# Patient Record
Sex: MALE | Race: WHITE | NOT HISPANIC OR LATINO | Employment: OTHER | URBAN - METROPOLITAN AREA
[De-identification: names, ages, dates, MRNs, and addresses within clinical notes are randomized per-mention and may not be internally consistent; named-entity substitution may affect disease eponyms.]

---

## 2017-03-06 RX ORDER — ALPRAZOLAM 0.25 MG/1
TABLET ORAL
Qty: 30 TABLET | Refills: 0 | OUTPATIENT
Start: 2017-03-06

## 2017-03-16 RX ORDER — GLIMEPIRIDE 2 MG/1
TABLET ORAL
Qty: 180 TABLET | Refills: 0 | Status: SHIPPED | OUTPATIENT
Start: 2017-03-16 | End: 2017-04-10 | Stop reason: SDUPTHER

## 2017-03-27 RX ORDER — HYDROCHLOROTHIAZIDE 25 MG/1
TABLET ORAL
Qty: 90 TABLET | Refills: 0 | Status: SHIPPED | OUTPATIENT
Start: 2017-03-27 | End: 2017-06-26 | Stop reason: SDUPTHER

## 2017-03-31 DIAGNOSIS — E78.00 PURE HYPERCHOLESTEROLEMIA: ICD-10-CM

## 2017-03-31 DIAGNOSIS — I10 ESSENTIAL HYPERTENSION: Primary | ICD-10-CM

## 2017-03-31 DIAGNOSIS — R39.12 POOR URINARY STREAM: ICD-10-CM

## 2017-03-31 DIAGNOSIS — IMO0001 UNCONTROLLED TYPE 2 DIABETES MELLITUS WITHOUT COMPLICATION, WITHOUT LONG-TERM CURRENT USE OF INSULIN: ICD-10-CM

## 2017-03-31 DIAGNOSIS — Z00.00 HEALTH CARE MAINTENANCE: ICD-10-CM

## 2017-04-03 LAB
ALBUMIN SERPL-MCNC: 4.4 G/DL (ref 3.5–5.2)
ALBUMIN/CREAT UR: 26.8 MG/G CREAT (ref 0–30)
ALBUMIN/GLOB SERPL: 1.8 G/DL
ALP SERPL-CCNC: 44 U/L (ref 39–117)
ALT SERPL-CCNC: 31 U/L (ref 1–41)
AST SERPL-CCNC: 28 U/L (ref 1–40)
BILIRUB SERPL-MCNC: 0.5 MG/DL (ref 0.1–1.2)
BUN SERPL-MCNC: 15 MG/DL (ref 8–23)
BUN/CREAT SERPL: 20.3 (ref 7–25)
CALCIUM SERPL-MCNC: 9.5 MG/DL (ref 8.6–10.5)
CHLORIDE SERPL-SCNC: 97 MMOL/L (ref 98–107)
CHOLEST SERPL-MCNC: 212 MG/DL (ref 0–200)
CO2 SERPL-SCNC: 30.2 MMOL/L (ref 22–29)
CREAT SERPL-MCNC: 0.74 MG/DL (ref 0.76–1.27)
CREAT UR-MCNC: 165.5 MG/DL
GLOBULIN SER CALC-MCNC: 2.5 GM/DL
GLUCOSE SERPL-MCNC: 126 MG/DL (ref 65–99)
HBA1C MFR BLD: 7.5 % (ref 4.8–5.6)
HDLC SERPL-MCNC: 52 MG/DL (ref 40–60)
LDLC SERPL CALC-MCNC: ABNORMAL MG/DL
MICROALBUMIN UR-MCNC: 44.3 UG/ML
POTASSIUM SERPL-SCNC: 4.4 MMOL/L (ref 3.5–5.2)
PROT SERPL-MCNC: 6.9 G/DL (ref 6–8.5)
PSA SERPL-MCNC: 0.84 NG/ML (ref 0–4)
SODIUM SERPL-SCNC: 141 MMOL/L (ref 136–145)
TRIGL SERPL-MCNC: 441 MG/DL (ref 0–150)
VLDLC SERPL CALC-MCNC: ABNORMAL MG/DL

## 2017-04-10 ENCOUNTER — OFFICE VISIT (OUTPATIENT)
Dept: FAMILY MEDICINE CLINIC | Facility: CLINIC | Age: 62
End: 2017-04-10

## 2017-04-10 VITALS
BODY MASS INDEX: 49.44 KG/M2 | OXYGEN SATURATION: 86 % | WEIGHT: 315 LBS | HEIGHT: 67 IN | TEMPERATURE: 98.4 F | RESPIRATION RATE: 20 BRPM | SYSTOLIC BLOOD PRESSURE: 162 MMHG | HEART RATE: 99 BPM | DIASTOLIC BLOOD PRESSURE: 88 MMHG

## 2017-04-10 DIAGNOSIS — E78.00 PURE HYPERCHOLESTEROLEMIA: ICD-10-CM

## 2017-04-10 DIAGNOSIS — IMO0001 UNCONTROLLED TYPE 2 DIABETES MELLITUS WITHOUT COMPLICATION, WITHOUT LONG-TERM CURRENT USE OF INSULIN: ICD-10-CM

## 2017-04-10 DIAGNOSIS — F41.9 ANXIETY: ICD-10-CM

## 2017-04-10 DIAGNOSIS — J43.8 OTHER EMPHYSEMA (HCC): ICD-10-CM

## 2017-04-10 DIAGNOSIS — I10 ESSENTIAL HYPERTENSION: Primary | ICD-10-CM

## 2017-04-10 PROCEDURE — 99214 OFFICE O/P EST MOD 30 MIN: CPT | Performed by: INTERNAL MEDICINE

## 2017-04-10 RX ORDER — GLIMEPIRIDE 2 MG/1
6 TABLET ORAL
Qty: 180 TABLET | Refills: 0 | OUTPATIENT
Start: 2017-04-10 | End: 2017-07-20 | Stop reason: SDUPTHER

## 2017-04-10 RX ORDER — ALPRAZOLAM 0.25 MG/1
0.25 TABLET ORAL NIGHTLY PRN
Qty: 30 TABLET | Refills: 5 | Status: SHIPPED | OUTPATIENT
Start: 2017-04-10 | End: 2017-10-18 | Stop reason: SDUPTHER

## 2017-04-10 NOTE — PROGRESS NOTES
Subjective   Patient ID: Dell Thorpe is a 62 y.o. male presents with   Chief Complaint   Patient presents with   • Follow-up     on lab results   • Med Refill     on alprazolam       HPI - this patient presents today for follow-up for anxiety, emphysema and smoking addiction, hypercholesterolemia, hypertension and had a-controlled diabetes.  He admits that he's not been taking his blood pressure medicine or his diabetic medicines correctly.  He forgets to take his second dose sometimes.  I talked to him about stopping smoking and he is not interested in that currently.  He does complain of coughing and shortness of breath.  His oxygen sats in the office or 86% we'll try to get him on oxygen.    Hypertension-change diltiazem to 60 mg twice daily continue hydrochlorothiazide 25 and lisinopril 20 recommend weight loss    Anxiety Xanax 0.25 by mouth daily at bedtime, 30, Sky reports obtaining narcotic agreement was signed this medicine helps with his quality of life he has no adverse effect he's adherent to the regimen    COPD-recommend smoking cessation may continue inhalers I gave him a prescription of a anti-cholinergic and beta-2 agonist combination inhaler.  We'll try to get him on oxygen    Hyperlipidemia Pravachol 40 triglycerides are high recommend he start some over-the-counter fish oil he does not have prescription coverage.  Also weight loss.    Uncontrolled type 2 diabetes-increase glimepiride to 6 mg with breakfast continue metformin excised diet and weight loss.        Allergies   Allergen Reactions   • Penicillins        The following portions of the patient's history were reviewed and updated as appropriate: allergies, current medications, past family history, past medical history, past social history, past surgical history and problem list.      Review of Systems   Constitutional: Positive for fatigue.   HENT: Positive for congestion and sore throat.    Eyes: Negative.    Respiratory: Positive for  "cough and shortness of breath.    Cardiovascular: Negative.    Gastrointestinal: Negative.    Genitourinary: Negative.    Musculoskeletal: Negative.    Skin: Negative.    Psychiatric/Behavioral: Negative.        Objective     Vitals:    04/10/17 1019   BP: 162/88   Pulse: 99   Resp: 20   Temp: 98.4 °F (36.9 °C)   TempSrc: Oral   SpO2: (!) 86%   Weight: (!) 339 lb 14.4 oz (154 kg)   Height: 67\" (170.2 cm)         Physical Exam   Constitutional: He is oriented to person, place, and time. He appears well-developed and well-nourished.   Obese and unhealthy appearing   HENT:   Head: Normocephalic and atraumatic.   Cardiovascular: Normal rate and regular rhythm.    Pulmonary/Chest: Effort normal. He has wheezes.   Neurological: He is alert and oriented to person, place, and time.   Psychiatric: He has a normal mood and affect. His behavior is normal.   Vitals reviewed.        Dell was seen today for follow-up and med refill.    Diagnoses and all orders for this visit:    Essential hypertension    Pure hypercholesterolemia    Other emphysema    Uncontrolled type 2 diabetes mellitus without complication, without long-term current use of insulin    Anxiety    Other orders  -     glimepiride (AMARYL) 2 MG tablet; Take 3 tablets by mouth Every Morning Before Breakfast.  -     diltiaZEM (CARDIZEM) 30 MG tablet; Take 2 tablets by mouth 2 (Two) Times a Day.  -     ALPRAZolam (XANAX) 0.25 MG tablet; Take 1 tablet by mouth At Night As Needed for Anxiety.        Call or return to clinic prn if these symptoms worsen or fail to improve as anticipated.  "

## 2017-04-20 ENCOUNTER — TELEPHONE (OUTPATIENT)
Dept: FAMILY MEDICINE CLINIC | Facility: CLINIC | Age: 62
End: 2017-04-20

## 2017-05-08 ENCOUNTER — TELEPHONE (OUTPATIENT)
Dept: FAMILY MEDICINE CLINIC | Facility: CLINIC | Age: 62
End: 2017-05-08

## 2017-06-26 RX ORDER — HYDROCHLOROTHIAZIDE 25 MG/1
TABLET ORAL
Qty: 90 TABLET | Refills: 0 | Status: SHIPPED | OUTPATIENT
Start: 2017-06-26 | End: 2017-10-10 | Stop reason: SDUPTHER

## 2017-07-21 RX ORDER — GLIMEPIRIDE 2 MG/1
TABLET ORAL
Qty: 180 TABLET | Refills: 0 | Status: SHIPPED | OUTPATIENT
Start: 2017-07-21 | End: 2017-10-10 | Stop reason: SDUPTHER

## 2017-10-10 RX ORDER — HYDROCHLOROTHIAZIDE 25 MG/1
TABLET ORAL
Qty: 90 TABLET | Refills: 0 | Status: SHIPPED | OUTPATIENT
Start: 2017-10-10 | End: 2018-02-13 | Stop reason: SDUPTHER

## 2017-10-10 RX ORDER — GLIMEPIRIDE 2 MG/1
2 TABLET ORAL 2 TIMES DAILY
Qty: 180 TABLET | Refills: 0 | Status: SHIPPED | OUTPATIENT
Start: 2017-10-10 | End: 2018-02-13 | Stop reason: SDUPTHER

## 2017-10-18 ENCOUNTER — OFFICE VISIT (OUTPATIENT)
Dept: FAMILY MEDICINE CLINIC | Facility: CLINIC | Age: 62
End: 2017-10-18

## 2017-10-18 VITALS
SYSTOLIC BLOOD PRESSURE: 142 MMHG | HEIGHT: 67 IN | HEART RATE: 80 BPM | TEMPERATURE: 98.2 F | BODY MASS INDEX: 49.44 KG/M2 | RESPIRATION RATE: 20 BRPM | OXYGEN SATURATION: 92 % | DIASTOLIC BLOOD PRESSURE: 84 MMHG | WEIGHT: 315 LBS

## 2017-10-18 DIAGNOSIS — E78.00 PURE HYPERCHOLESTEROLEMIA: ICD-10-CM

## 2017-10-18 DIAGNOSIS — J43.8 OTHER EMPHYSEMA (HCC): ICD-10-CM

## 2017-10-18 DIAGNOSIS — I10 ESSENTIAL HYPERTENSION: Primary | ICD-10-CM

## 2017-10-18 DIAGNOSIS — F41.9 ANXIETY: ICD-10-CM

## 2017-10-18 DIAGNOSIS — IMO0001 UNCONTROLLED TYPE 2 DIABETES MELLITUS WITHOUT COMPLICATION, WITHOUT LONG-TERM CURRENT USE OF INSULIN: ICD-10-CM

## 2017-10-18 PROCEDURE — 99214 OFFICE O/P EST MOD 30 MIN: CPT | Performed by: INTERNAL MEDICINE

## 2017-10-18 RX ORDER — ALPRAZOLAM 0.25 MG/1
0.25 TABLET ORAL NIGHTLY PRN
Qty: 30 TABLET | Refills: 5 | Status: SHIPPED | OUTPATIENT
Start: 2017-10-18 | End: 2018-06-12 | Stop reason: SDUPTHER

## 2017-10-18 NOTE — PROGRESS NOTES
"Subjective   Patient ID: Dell Thorpe is a 62 y.o. male presents with   Chief Complaint   Patient presents with   • Diabetes     6 month check       HPI - This patient has past medical history of smoking addiction, hypertension, hypercholesterolemia, emphysema, uncontrolled type 2 diabetes and anxiety.  Patient generally is noncompliant.  She's not interested in discontinuing cigarettes.  He is here today for refills and labs.    Assessment plan    Essential hypertension diltiazem hydrochlorothiazide lisinopril controlled we'll get a CMP    Hyperlipidemia Pravachol 40 fasting lipid profile    Type 2 diabetes-he's on Amaryl 6 mg get A1c and a CMP    Anxiety Xanax when necessary    Allergies   Allergen Reactions   • Penicillins        The following portions of the patient's history were reviewed and updated as appropriate: allergies, current medications, past family history, past medical history, past social history, past surgical history and problem list.      Review of Systems   Constitutional: Positive for fatigue.   HENT: Negative.    Respiratory: Positive for shortness of breath.    Cardiovascular: Negative.    Gastrointestinal: Negative.    Musculoskeletal: Negative.    Allergic/Immunologic: Negative.    Neurological: Negative.    Psychiatric/Behavioral: Negative.        Objective     Vitals:    10/18/17 0936   BP: 142/84   Pulse: 80   Resp: 20   Temp: 98.2 °F (36.8 °C)   TempSrc: Oral   SpO2: 92%   Weight: (!) 337 lb (153 kg)   Height: 67\" (170.2 cm)         Physical Exam   Constitutional: He is oriented to person, place, and time. He appears well-developed and well-nourished.   HENT:   Head: Normocephalic and atraumatic.   Eyes: EOM are normal. Pupils are equal, round, and reactive to light.   Cardiovascular: Normal rate, regular rhythm and normal heart sounds.    Pulmonary/Chest: Effort normal. He has wheezes.   Neurological: He is alert and oriented to person, place, and time.   Psychiatric: He has a normal " mood and affect. His behavior is normal.   Nursing note and vitals reviewed.        Dell was seen today for diabetes.    Diagnoses and all orders for this visit:    Essential hypertension  -     Lipid Panel  -     Hemoglobin A1c  -     Comprehensive Metabolic Panel    Pure hypercholesterolemia  -     Lipid Panel  -     Hemoglobin A1c  -     Comprehensive Metabolic Panel    Other emphysema  -     Lipid Panel  -     Hemoglobin A1c  -     Comprehensive Metabolic Panel    Uncontrolled type 2 diabetes mellitus without complication, without long-term current use of insulin  -     Lipid Panel  -     Hemoglobin A1c  -     Comprehensive Metabolic Panel    Anxiety  -     Lipid Panel  -     Hemoglobin A1c  -     Comprehensive Metabolic Panel    Other orders  -     ALPRAZolam (XANAX) 0.25 MG tablet; Take 1 tablet by mouth At Night As Needed for Anxiety.        Call or return to clinic prn if these symptoms worsen or fail to improve as anticipated.

## 2017-10-19 LAB
ALBUMIN SERPL-MCNC: 4.2 G/DL (ref 3.5–5.2)
ALBUMIN/GLOB SERPL: 1.7 G/DL
ALP SERPL-CCNC: 46 U/L (ref 39–117)
ALT SERPL-CCNC: 28 U/L (ref 1–41)
AST SERPL-CCNC: 20 U/L (ref 1–40)
BILIRUB SERPL-MCNC: 0.5 MG/DL (ref 0.1–1.2)
BUN SERPL-MCNC: 17 MG/DL (ref 8–23)
BUN/CREAT SERPL: 22.1 (ref 7–25)
CALCIUM SERPL-MCNC: 9.1 MG/DL (ref 8.6–10.5)
CHLORIDE SERPL-SCNC: 98 MMOL/L (ref 98–107)
CHOLEST SERPL-MCNC: 177 MG/DL (ref 0–200)
CO2 SERPL-SCNC: 28.2 MMOL/L (ref 22–29)
CREAT SERPL-MCNC: 0.77 MG/DL (ref 0.76–1.27)
GLOBULIN SER CALC-MCNC: 2.5 GM/DL
GLUCOSE SERPL-MCNC: 143 MG/DL (ref 65–99)
HBA1C MFR BLD: 7.5 % (ref 4.8–5.6)
HDLC SERPL-MCNC: 59 MG/DL (ref 40–60)
LDLC SERPL CALC-MCNC: 73 MG/DL (ref 0–100)
POTASSIUM SERPL-SCNC: 3.7 MMOL/L (ref 3.5–5.2)
PROT SERPL-MCNC: 6.7 G/DL (ref 6–8.5)
SODIUM SERPL-SCNC: 144 MMOL/L (ref 136–145)
TRIGL SERPL-MCNC: 226 MG/DL (ref 0–150)
VLDLC SERPL CALC-MCNC: 45.2 MG/DL (ref 5–40)

## 2018-02-13 RX ORDER — GLIMEPIRIDE 2 MG/1
2 TABLET ORAL
Qty: 180 TABLET | Refills: 1 | Status: SHIPPED | OUTPATIENT
Start: 2018-02-13 | End: 2018-11-02 | Stop reason: SDUPTHER

## 2018-02-13 RX ORDER — HYDROCHLOROTHIAZIDE 25 MG/1
25 TABLET ORAL DAILY
Qty: 90 TABLET | Refills: 1 | Status: SHIPPED | OUTPATIENT
Start: 2018-02-13

## 2018-02-13 RX ORDER — PRAVASTATIN SODIUM 40 MG
40 TABLET ORAL DAILY
Qty: 90 TABLET | Refills: 1 | Status: SHIPPED | OUTPATIENT
Start: 2018-02-13 | End: 2018-03-29

## 2018-02-13 RX ORDER — LISINOPRIL 20 MG/1
20 TABLET ORAL DAILY
Qty: 90 TABLET | Refills: 1 | Status: SHIPPED | OUTPATIENT
Start: 2018-02-13 | End: 2018-07-09 | Stop reason: SDUPTHER

## 2018-02-23 ENCOUNTER — OFFICE VISIT (OUTPATIENT)
Dept: FAMILY MEDICINE CLINIC | Facility: CLINIC | Age: 63
End: 2018-02-23

## 2018-02-23 VITALS
OXYGEN SATURATION: 93 % | TEMPERATURE: 98 F | HEART RATE: 90 BPM | SYSTOLIC BLOOD PRESSURE: 122 MMHG | WEIGHT: 315 LBS | BODY MASS INDEX: 49.44 KG/M2 | HEIGHT: 67 IN | RESPIRATION RATE: 18 BRPM | DIASTOLIC BLOOD PRESSURE: 80 MMHG

## 2018-02-23 DIAGNOSIS — Z12.5 PROSTATE CANCER SCREENING: ICD-10-CM

## 2018-02-23 DIAGNOSIS — R09.02 HYPOXIA: ICD-10-CM

## 2018-02-23 DIAGNOSIS — J43.8 OTHER EMPHYSEMA (HCC): ICD-10-CM

## 2018-02-23 DIAGNOSIS — E78.00 PURE HYPERCHOLESTEROLEMIA: ICD-10-CM

## 2018-02-23 DIAGNOSIS — R39.12 POOR URINARY STREAM: ICD-10-CM

## 2018-02-23 DIAGNOSIS — IMO0001 UNCONTROLLED TYPE 2 DIABETES MELLITUS WITHOUT COMPLICATION, WITHOUT LONG-TERM CURRENT USE OF INSULIN: ICD-10-CM

## 2018-02-23 DIAGNOSIS — I10 ESSENTIAL HYPERTENSION: Primary | ICD-10-CM

## 2018-02-23 PROCEDURE — 99214 OFFICE O/P EST MOD 30 MIN: CPT | Performed by: INTERNAL MEDICINE

## 2018-02-23 RX ORDER — THEOPHYLLINE ANHYDROUS 200 MG/1
200 CAPSULE, EXTENDED RELEASE ORAL DAILY
Qty: 30 CAPSULE | Refills: 1 | Status: SHIPPED | OUTPATIENT
Start: 2018-02-23

## 2018-02-23 NOTE — PROGRESS NOTES
"Subjective   Patient ID: Dell Thorpe is a 63 y.o. male presents with   Chief Complaint   Patient presents with   • needs to renew oxygen orders       HPI - This patient presents today for follow-up and treatment for hypertension, hypercholesterolemia type 2 diabetes he complains of a poor urinary stream and he has emphysema.  He still smokes despite multiple warnings to stop.  He is on oxygen.  He's been on oxygen for a number of years oxygen helps his quality of life and is necessary.  He has known to desaturate at night and with exertion.  I recommend he stay on oxygen    Assessment plan    Essential hypertension controlled with lisinopril    Uncontrolled diabetes Amaryl and metformin will get an A1c    Poor urinary stream will get a PSA    Hypoxia and emphysema-continue oxygen at night and as needed    Hypercholesterolemia pravastatin 40 and get a fasting lipid profile.        Allergies   Allergen Reactions   • Penicillins        The following portions of the patient's history were reviewed and updated as appropriate: allergies, current medications, past family history, past medical history, past social history, past surgical history and problem list.      Review of Systems   Constitutional: Negative.    HENT: Negative.    Respiratory: Positive for cough, shortness of breath and wheezing.    Cardiovascular: Negative.    Gastrointestinal: Negative.    Genitourinary: Positive for difficulty urinating and frequency.   Musculoskeletal: Negative.    Psychiatric/Behavioral: Negative.        Objective     Vitals:    02/23/18 0934   BP: 122/80   Pulse: 90   Resp: 18   Temp: 98 °F (36.7 °C)   TempSrc: Oral   SpO2: 93%   Weight: (!) 148 kg (326 lb)   Height: 170.2 cm (67\")         Physical Exam   Constitutional: He is oriented to person, place, and time. He appears well-developed and well-nourished. No distress.   HENT:   Head: Normocephalic and atraumatic.   Eyes: Conjunctivae and EOM are normal. Pupils are equal, round, " and reactive to light. Right eye exhibits no discharge. Left eye exhibits no discharge. No scleral icterus.   Neck: Normal range of motion. Neck supple. No tracheal deviation present. No thyromegaly present.   Cardiovascular: Normal rate, regular rhythm, normal heart sounds and normal pulses.  Exam reveals no gallop.    No murmur heard.  Pulmonary/Chest: Effort normal. No respiratory distress. He has wheezes. He has no rales.   Musculoskeletal: Normal range of motion.   Neurological: He is alert and oriented to person, place, and time.   Skin: Skin is warm. No rash noted. No erythema. No pallor.   Psychiatric: He has a normal mood and affect. His behavior is normal. Judgment and thought content normal.   Nursing note and vitals reviewed.        Dell was seen today for needs to renew oxygen orders.    Diagnoses and all orders for this visit:    Essential hypertension  -     Lipid Panel  -     PSA DIAGNOSTIC  -     Hemoglobin A1c  -     Microalbumin / Creatinine Urine Ratio - Urine, Clean Catch  -     Comprehensive Metabolic Panel    Pure hypercholesterolemia  -     Lipid Panel  -     PSA DIAGNOSTIC  -     Hemoglobin A1c  -     Microalbumin / Creatinine Urine Ratio - Urine, Clean Catch  -     Comprehensive Metabolic Panel    Uncontrolled type 2 diabetes mellitus without complication, without long-term current use of insulin  -     Lipid Panel  -     PSA DIAGNOSTIC  -     Hemoglobin A1c  -     Microalbumin / Creatinine Urine Ratio - Urine, Clean Catch  -     Comprehensive Metabolic Panel    Prostate cancer screening  -     Lipid Panel  -     PSA DIAGNOSTIC  -     Hemoglobin A1c  -     Microalbumin / Creatinine Urine Ratio - Urine, Clean Catch  -     Comprehensive Metabolic Panel    Poor urinary stream   -     PSA DIAGNOSTIC    Other emphysema    Hypoxia    Other orders  -     SABRA-24 200 MG 24 hr capsule; Take 1 capsule by mouth Daily.        Call or return to clinic prn if these symptoms worsen or fail to improve as  anticipated.

## 2018-02-24 LAB
ALBUMIN SERPL-MCNC: 4.2 G/DL (ref 3.5–5.2)
ALBUMIN/CREAT UR: 32.7 MG/G CREAT (ref 0–30)
ALBUMIN/GLOB SERPL: 1.6 G/DL
ALP SERPL-CCNC: 54 U/L (ref 39–117)
ALT SERPL-CCNC: 20 U/L (ref 1–41)
AST SERPL-CCNC: 12 U/L (ref 1–40)
BILIRUB SERPL-MCNC: 0.6 MG/DL (ref 0.1–1.2)
BUN SERPL-MCNC: 14 MG/DL (ref 8–23)
BUN/CREAT SERPL: 18.7 (ref 7–25)
CALCIUM SERPL-MCNC: 9.7 MG/DL (ref 8.6–10.5)
CHLORIDE SERPL-SCNC: 93 MMOL/L (ref 98–107)
CHOLEST SERPL-MCNC: 204 MG/DL (ref 0–200)
CO2 SERPL-SCNC: 33.6 MMOL/L (ref 22–29)
CREAT SERPL-MCNC: 0.75 MG/DL (ref 0.76–1.27)
CREAT UR-MCNC: 216.2 MG/DL
GFR SERPLBLD CREATININE-BSD FMLA CKD-EPI: 106 ML/MIN/1.73
GFR SERPLBLD CREATININE-BSD FMLA CKD-EPI: 128 ML/MIN/1.73
GLOBULIN SER CALC-MCNC: 2.7 GM/DL
GLUCOSE SERPL-MCNC: 206 MG/DL (ref 65–99)
HBA1C MFR BLD: 9.45 % (ref 4.8–5.6)
HDLC SERPL-MCNC: 47 MG/DL (ref 40–60)
LDLC SERPL CALC-MCNC: 77 MG/DL (ref 0–100)
MICROALBUMIN UR-MCNC: 70.8 UG/ML
POTASSIUM SERPL-SCNC: 4 MMOL/L (ref 3.5–5.2)
PROT SERPL-MCNC: 6.9 G/DL (ref 6–8.5)
PSA SERPL-MCNC: 1.02 NG/ML (ref 0–4)
SODIUM SERPL-SCNC: 138 MMOL/L (ref 136–145)
TRIGL SERPL-MCNC: 400 MG/DL (ref 0–150)
VLDLC SERPL CALC-MCNC: 80 MG/DL (ref 5–40)

## 2018-02-26 DIAGNOSIS — IMO0001 UNCONTROLLED TYPE 2 DIABETES MELLITUS WITHOUT COMPLICATION, WITHOUT LONG-TERM CURRENT USE OF INSULIN: Primary | ICD-10-CM

## 2018-03-27 ENCOUNTER — OFFICE VISIT (OUTPATIENT)
Dept: ENDOCRINOLOGY | Age: 63
End: 2018-03-27

## 2018-03-27 VITALS
RESPIRATION RATE: 16 BRPM | WEIGHT: 315 LBS | HEIGHT: 68 IN | SYSTOLIC BLOOD PRESSURE: 136 MMHG | BODY MASS INDEX: 47.74 KG/M2 | DIASTOLIC BLOOD PRESSURE: 82 MMHG

## 2018-03-27 DIAGNOSIS — E66.01 MORBID OBESITY (HCC): ICD-10-CM

## 2018-03-27 DIAGNOSIS — Z12.5 PROSTATE CANCER SCREENING: ICD-10-CM

## 2018-03-27 DIAGNOSIS — I10 ESSENTIAL HYPERTENSION: ICD-10-CM

## 2018-03-27 DIAGNOSIS — E78.00 PURE HYPERCHOLESTEROLEMIA: ICD-10-CM

## 2018-03-27 DIAGNOSIS — IMO0001 UNCONTROLLED TYPE 2 DIABETES MELLITUS WITHOUT COMPLICATION, WITHOUT LONG-TERM CURRENT USE OF INSULIN: Primary | ICD-10-CM

## 2018-03-27 DIAGNOSIS — Z71.6 TOBACCO ABUSE COUNSELING: ICD-10-CM

## 2018-03-27 DIAGNOSIS — E55.9 VITAMIN D DEFICIENCY: ICD-10-CM

## 2018-03-27 DIAGNOSIS — E78.5 DYSLIPIDEMIA: ICD-10-CM

## 2018-03-27 PROCEDURE — 99214 OFFICE O/P EST MOD 30 MIN: CPT | Performed by: INTERNAL MEDICINE

## 2018-03-27 RX ORDER — EXENATIDE 2 MG/.85ML
2 INJECTION, SUSPENSION, EXTENDED RELEASE SUBCUTANEOUS WEEKLY
Qty: 4 PEN | Refills: 5 | Status: SHIPPED | OUTPATIENT
Start: 2018-03-27 | End: 2018-11-02 | Stop reason: SDUPTHER

## 2018-03-27 RX ORDER — SITAGLIPTIN AND METFORMIN HYDROCHLORIDE 1000; 50 MG/1; MG/1
2 TABLET, FILM COATED, EXTENDED RELEASE ORAL
Qty: 60 TABLET | Refills: 5 | Status: SHIPPED | OUTPATIENT
Start: 2018-03-27 | End: 2018-11-02 | Stop reason: SDUPTHER

## 2018-03-27 RX ORDER — DAPAGLIFLOZIN 10 MG/1
TABLET, FILM COATED ORAL
Qty: 30 TABLET | Refills: 5 | Status: SHIPPED | OUTPATIENT
Start: 2018-03-27 | End: 2018-11-02 | Stop reason: SDUPTHER

## 2018-03-27 NOTE — PROGRESS NOTES
"Subjective   Dell Thorpe is a 63 y.o. male seen as a new patient for DM2. He is not checking BG. He denies any problems or concerns.     History of Present Illness this is a 63-year-old gentleman who has been referred for further evaluation and treatment of his type II diabetes and associated metabolic disorders.  She said she has had diabetes since 2700 has been on metformin and glimepiride.  He also used to be on Avandia and is no longer on that because of its unavailability.  He is disabled because of his COPD and the arthritis.  He has a lifetime heavy smoker and has no plans for stopping a.  During the course of this office visit he could hardly speak in full sentences before interrupting get with series of coughs.  His family history is replete with type II diabetes heart disease and stroke.  He has been  and at this time is  and has never fathered a child.    /82   Resp 16   Ht 172.7 cm (68\")   Wt (!) 146 kg (322 lb 12.8 oz)   BMI 49.08 kg/m²      Allergies   Allergen Reactions   • Penicillins        Current Outpatient Prescriptions:   •  albuterol (PROVENTIL HFA;VENTOLIN HFA) 108 (90 BASE) MCG/ACT inhaler, Proventil  (90 Base) MCG/ACT Inhalation Aerosol Solution; Patient Sig: Proventil  (90 Base) MCG/ACT Inhalation Aerosol Solution INHALE 1 TO 2 PUFFS EVERY 4 TO 6 HOURS AS NEEDED.; 1; 5; 04-Dec-2015; Active, Disp: , Rfl:   •  ALPRAZolam (XANAX) 0.25 MG tablet, Take 1 tablet by mouth At Night As Needed for Anxiety., Disp: 30 tablet, Rfl: 5  •  glimepiride (AMARYL) 2 MG tablet, Take 1 tablet by mouth Every Morning Before Breakfast., Disp: 180 tablet, Rfl: 1  •  hydrochlorothiazide (HYDRODIURIL) 25 MG tablet, Take 1 tablet by mouth Daily., Disp: 90 tablet, Rfl: 1  •  lisinopril (PRINIVIL,ZESTRIL) 20 MG tablet, Take 1 tablet by mouth Daily., Disp: 90 tablet, Rfl: 1  •  metFORMIN (GLUCOPHAGE) 500 MG tablet, Take 1 tablet by mouth 2 (Two) Times a Day With Meals., Disp: 360 " tablet, Rfl: 1  •  pravastatin (PRAVACHOL) 40 MG tablet, Take 1 tablet by mouth Daily., Disp: 90 tablet, Rfl: 1  •  SABRA-24 200 MG 24 hr capsule, Take 1 capsule by mouth Daily., Disp: 30 capsule, Rfl: 1    The following portions of the patient's history were reviewed and updated as appropriate: allergies, current medications, past family history, past medical history, past social history, past surgical history and problem list.    Review of Systems   Constitutional: Negative.    HENT: Negative.    Eyes: Negative.    Respiratory: Negative.    Cardiovascular: Negative.    Gastrointestinal: Negative.    Endocrine: Negative.    Genitourinary: Negative.    Musculoskeletal: Negative.    Skin: Negative.    Allergic/Immunologic: Negative.    Neurological: Negative.    Hematological: Negative.    Psychiatric/Behavioral: Negative.        Objective   Physical Exam   Constitutional: He is oriented to person, place, and time. He appears well-developed and well-nourished. No distress.   Morbidly obese and almost constantly coughing.   HENT:   Head: Normocephalic and atraumatic.   Right Ear: External ear normal.   Left Ear: External ear normal.   Nose: Nose normal.   Mouth/Throat: Oropharynx is clear and moist. No oropharyngeal exudate.   Eyes: Conjunctivae and EOM are normal. Pupils are equal, round, and reactive to light. Right eye exhibits no discharge. Left eye exhibits no discharge. No scleral icterus.   Neck: Normal range of motion. Neck supple. No JVD present. No tracheal deviation present. No thyromegaly present.   Cardiovascular: Normal rate, regular rhythm, normal heart sounds, intact distal pulses and normal pulses.  Exam reveals no gallop and no friction rub.    No murmur heard.  Pulmonary/Chest: Effort normal. No stridor. No respiratory distress. He has wheezes. He has no rales. He exhibits no tenderness.   Both inspiratory and expiratory wheezing   Abdominal: Soft. Bowel sounds are normal. He exhibits no distension  and no mass. There is no tenderness. There is no rebound and no guarding. No hernia.   Massive  abdomen   Musculoskeletal: Normal range of motion. He exhibits no edema, tenderness or deformity.   Lymphadenopathy:     He has no cervical adenopathy.   Neurological: He is alert and oriented to person, place, and time. He has normal reflexes. He displays normal reflexes. No cranial nerve deficit. He exhibits normal muscle tone. Coordination normal.   Skin: Skin is warm and dry. No rash noted. He is not diaphoretic. No erythema. No pallor.   Reddish face and eyes and the rosacea on both cheeks.  Skin tags and acanthosis nigricans around his neck indicating significant degree of insulin resistance   Psychiatric: He has a normal mood and affect. His behavior is normal. Judgment and thought content normal.   Nursing note and vitals reviewed.        Assessment/Plan   Diagnoses and all orders for this visit:    Uncontrolled type 2 diabetes mellitus without complication, without long-term current use of insulin  -     T4 & TSH ([facility])  -     T3, Free  -     T4, Free  -     TestT+TestF+SHBG  -     Thyroglobulin With Anti-TG  -     Uric Acid  -     Vitamin D 25 Hydroxy  -     CBC & Differential  -     Comprehensive Metabolic Panel  -     C-Peptide  -     Follicle Stimulating Hormone  -     Hemoglobin A1c  -     Lipid Panel  -     Luteinizing Hormone  -     MicroAlbumin, Urine, Random - Urine, Clean Catch  -     Prolactin  -     ACTH  -     Cortisol  -     BYDUREON BCISE 2 MG/0.85ML auto-injector injection; Inject 0.85 mL under the skin 1 (One) Time Per Week.  -     JANUMET XR  MG tablet; Take 2 tablets by mouth Daily With Dinner.  -     FARXIGA 10 MG tablet; 1 tablet every morning  -     T4 & TSH ([facility]); Future  -     TestT+TestF+SHBG; Future  -     Uric Acid; Future  -     Vitamin D 25 Hydroxy; Future  -     Comprehensive Metabolic Panel; Future  -     C-Peptide; Future  -     Hemoglobin A1c; Future  -     Lipid  Panel; Future  -     MicroAlbumin, Urine, Random - Urine, Clean Catch; Future  -     Hemoglobin & Hematocrit, Blood; Future    Essential hypertension  -     T4 & TSH ([facility])  -     T3, Free  -     T4, Free  -     TestT+TestF+SHBG  -     Thyroglobulin With Anti-TG  -     Uric Acid  -     Vitamin D 25 Hydroxy  -     CBC & Differential  -     Comprehensive Metabolic Panel  -     C-Peptide  -     Follicle Stimulating Hormone  -     Hemoglobin A1c  -     Lipid Panel  -     Luteinizing Hormone  -     MicroAlbumin, Urine, Random - Urine, Clean Catch  -     Prolactin  -     ACTH  -     Cortisol  -     BYDUREON BCISE 2 MG/0.85ML auto-injector injection; Inject 0.85 mL under the skin 1 (One) Time Per Week.  -     JANUMET XR  MG tablet; Take 2 tablets by mouth Daily With Dinner.  -     FARXIGA 10 MG tablet; 1 tablet every morning  -     T4 & TSH ([facility]); Future  -     TestT+TestF+SHBG; Future  -     Uric Acid; Future  -     Vitamin D 25 Hydroxy; Future  -     Comprehensive Metabolic Panel; Future  -     C-Peptide; Future  -     Hemoglobin A1c; Future  -     Lipid Panel; Future  -     MicroAlbumin, Urine, Random - Urine, Clean Catch; Future  -     Hemoglobin & Hematocrit, Blood; Future    Pure hypercholesterolemia  -     T4 & TSH ([facility])  -     T3, Free  -     T4, Free  -     TestT+TestF+SHBG  -     Thyroglobulin With Anti-TG  -     Uric Acid  -     Vitamin D 25 Hydroxy  -     CBC & Differential  -     Comprehensive Metabolic Panel  -     C-Peptide  -     Follicle Stimulating Hormone  -     Hemoglobin A1c  -     Lipid Panel  -     Luteinizing Hormone  -     MicroAlbumin, Urine, Random - Urine, Clean Catch  -     Prolactin  -     ACTH  -     Cortisol  -     BYDUREON BCISE 2 MG/0.85ML auto-injector injection; Inject 0.85 mL under the skin 1 (One) Time Per Week.  -     JANUMET XR  MG tablet; Take 2 tablets by mouth Daily With Dinner.  -     FARXIGA 10 MG tablet; 1 tablet every morning  -     T4 & TSH ([facility]);  Future  -     TestT+TestF+SHBG; Future  -     Uric Acid; Future  -     Vitamin D 25 Hydroxy; Future  -     Comprehensive Metabolic Panel; Future  -     C-Peptide; Future  -     Hemoglobin A1c; Future  -     Lipid Panel; Future  -     MicroAlbumin, Urine, Random - Urine, Clean Catch; Future  -     Hemoglobin & Hematocrit, Blood; Future    Morbid obesity  -     T4 & TSH ([facility])  -     T3, Free  -     T4, Free  -     TestT+TestF+SHBG  -     Thyroglobulin With Anti-TG  -     Uric Acid  -     Vitamin D 25 Hydroxy  -     CBC & Differential  -     Comprehensive Metabolic Panel  -     C-Peptide  -     Follicle Stimulating Hormone  -     Hemoglobin A1c  -     Lipid Panel  -     Luteinizing Hormone  -     MicroAlbumin, Urine, Random - Urine, Clean Catch  -     Prolactin  -     ACTH  -     Cortisol  -     BYDUREON BCISE 2 MG/0.85ML auto-injector injection; Inject 0.85 mL under the skin 1 (One) Time Per Week.  -     JANUMET XR  MG tablet; Take 2 tablets by mouth Daily With Dinner.  -     FARXIGA 10 MG tablet; 1 tablet every morning  -     T4 & TSH ([facility]); Future  -     TestT+TestF+SHBG; Future  -     Uric Acid; Future  -     Vitamin D 25 Hydroxy; Future  -     Comprehensive Metabolic Panel; Future  -     C-Peptide; Future  -     Hemoglobin A1c; Future  -     Lipid Panel; Future  -     MicroAlbumin, Urine, Random - Urine, Clean Catch; Future  -     Hemoglobin & Hematocrit, Blood; Future    Dyslipidemia  -     T4 & TSH ([facility])  -     T3, Free  -     T4, Free  -     TestT+TestF+SHBG  -     Thyroglobulin With Anti-TG  -     Uric Acid  -     Vitamin D 25 Hydroxy  -     CBC & Differential  -     Comprehensive Metabolic Panel  -     C-Peptide  -     Follicle Stimulating Hormone  -     Hemoglobin A1c  -     Lipid Panel  -     Luteinizing Hormone  -     MicroAlbumin, Urine, Random - Urine, Clean Catch  -     Prolactin  -     ACTH  -     Cortisol  -     BYDUREON BCISE 2 MG/0.85ML auto-injector injection; Inject 0.85 mL  under the skin 1 (One) Time Per Week.  -     JANUMET XR  MG tablet; Take 2 tablets by mouth Daily With Dinner.  -     FARXIGA 10 MG tablet; 1 tablet every morning  -     T4 & TSH ([facility]); Future  -     TestT+TestF+SHBG; Future  -     Uric Acid; Future  -     Vitamin D 25 Hydroxy; Future  -     Comprehensive Metabolic Panel; Future  -     C-Peptide; Future  -     Hemoglobin A1c; Future  -     Lipid Panel; Future  -     MicroAlbumin, Urine, Random - Urine, Clean Catch; Future  -     Hemoglobin & Hematocrit, Blood; Future    Vitamin D deficiency  -     T4 & TSH ([facility])  -     T3, Free  -     T4, Free  -     TestT+TestF+SHBG  -     Thyroglobulin With Anti-TG  -     Uric Acid  -     Vitamin D 25 Hydroxy  -     CBC & Differential  -     Comprehensive Metabolic Panel  -     C-Peptide  -     Follicle Stimulating Hormone  -     Hemoglobin A1c  -     Lipid Panel  -     Luteinizing Hormone  -     MicroAlbumin, Urine, Random - Urine, Clean Catch  -     Prolactin  -     ACTH  -     Cortisol  -     BYDUREON BCISE 2 MG/0.85ML auto-injector injection; Inject 0.85 mL under the skin 1 (One) Time Per Week.  -     JANUMET XR  MG tablet; Take 2 tablets by mouth Daily With Dinner.  -     FARXIGA 10 MG tablet; 1 tablet every morning  -     T4 & TSH ([facility]); Future  -     TestT+TestF+SHBG; Future  -     Uric Acid; Future  -     Vitamin D 25 Hydroxy; Future  -     Comprehensive Metabolic Panel; Future  -     C-Peptide; Future  -     Hemoglobin A1c; Future  -     Lipid Panel; Future  -     MicroAlbumin, Urine, Random - Urine, Clean Catch; Future  -     Hemoglobin & Hematocrit, Blood; Future    Prostate cancer screening  -     T4 & TSH ([facility])  -     T3, Free  -     T4, Free  -     TestT+TestF+SHBG  -     Thyroglobulin With Anti-TG  -     Uric Acid  -     Vitamin D 25 Hydroxy  -     CBC & Differential  -     Comprehensive Metabolic Panel  -     C-Peptide  -     Follicle Stimulating Hormone  -     Hemoglobin A1c  -      Lipid Panel  -     Luteinizing Hormone  -     MicroAlbumin, Urine, Random - Urine, Clean Catch  -     Prolactin  -     ACTH  -     Cortisol  -     BYDUREON BCISE 2 MG/0.85ML auto-injector injection; Inject 0.85 mL under the skin 1 (One) Time Per Week.  -     JANUMET XR  MG tablet; Take 2 tablets by mouth Daily With Dinner.  -     FARXIGA 10 MG tablet; 1 tablet every morning  -     T4 & TSH ([facility]); Future  -     TestT+TestF+SHBG; Future  -     Uric Acid; Future  -     Vitamin D 25 Hydroxy; Future  -     Comprehensive Metabolic Panel; Future  -     C-Peptide; Future  -     Hemoglobin A1c; Future  -     Lipid Panel; Future  -     MicroAlbumin, Urine, Random - Urine, Clean Catch; Future  -     Hemoglobin & Hematocrit, Blood; Future    Tobacco abuse counseling  -     T4 & TSH ([facility]); Future  -     TestT+TestF+SHBG; Future  -     Uric Acid; Future  -     Vitamin D 25 Hydroxy; Future  -     Comprehensive Metabolic Panel; Future  -     C-Peptide; Future  -     Hemoglobin A1c; Future  -     Lipid Panel; Future  -     MicroAlbumin, Urine, Random - Urine, Clean Catch; Future  -     Hemoglobin & Hematocrit, Blood; Future               In summary I saw and examined this 63-year-old gentleman for above-mentioned problems.  I reviewed his laboratory evaluation of 10/18/2017 and 02/23/2018 and at this time we will go ahead and order an extensive laboratory evaluation and once the results come back we will go ahead and call for any possible modification or new medications.  In the meantime I am is starting him on Janumet XR 50/1000 mg to take 2 tablets with supper,Farxiga 5 mg every morning for 4 weeks and if no problem increase it to 10 mg every morning and finally Bydureon 2 mg once weekly.  I also had a discussion with him about the dire consequences of continued smoking and the fact that he is already having trouble with his breathing which has rendered him disabled all as a result of long-standing habit of smoking  cigarettes.  He will see MsTawny Mirlande Corbettyakov in 3 months or sooner if needed with laboratory evaluation prior to each office visit.  This office visit including 35 minutes of the time being spent on face-to-face counseling and education exceeded 60 minutes.

## 2018-03-29 LAB
25(OH)D3+25(OH)D2 SERPL-MCNC: 11.7 NG/ML (ref 30–100)
ACTH PLAS-MCNC: 41.1 PG/ML (ref 7.2–63.3)
ALBUMIN SERPL-MCNC: 4.3 G/DL (ref 3.5–5.2)
ALBUMIN/GLOB SERPL: 1.7 G/DL
ALP SERPL-CCNC: 59 U/L (ref 39–117)
ALT SERPL-CCNC: 30 U/L (ref 1–41)
AST SERPL-CCNC: 27 U/L (ref 1–40)
BASOPHILS # BLD AUTO: 0.03 10*3/MM3 (ref 0–0.2)
BASOPHILS NFR BLD AUTO: 0.4 % (ref 0–1.5)
BILIRUB SERPL-MCNC: 0.5 MG/DL (ref 0.1–1.2)
BUN SERPL-MCNC: 12 MG/DL (ref 8–23)
BUN/CREAT SERPL: 17.6 (ref 7–25)
C PEPTIDE SERPL-MCNC: 5.4 NG/ML (ref 1.1–4.4)
CALCIUM SERPL-MCNC: 9.6 MG/DL (ref 8.6–10.5)
CHLORIDE SERPL-SCNC: 95 MMOL/L (ref 98–107)
CHOLEST SERPL-MCNC: 224 MG/DL (ref 0–200)
CO2 SERPL-SCNC: 28.1 MMOL/L (ref 22–29)
CORTIS SERPL-MCNC: 16.7 UG/DL
CREAT SERPL-MCNC: 0.68 MG/DL (ref 0.76–1.27)
EOSINOPHIL # BLD AUTO: 0.11 10*3/MM3 (ref 0–0.7)
EOSINOPHIL NFR BLD AUTO: 1.3 % (ref 0.3–6.2)
ERYTHROCYTE [DISTWIDTH] IN BLOOD BY AUTOMATED COUNT: 14.3 % (ref 11.5–14.5)
FSH SERPL-ACNC: 4.8 MIU/ML (ref 1.5–12.4)
GFR SERPLBLD CREATININE-BSD FMLA CKD-EPI: 118 ML/MIN/1.73
GFR SERPLBLD CREATININE-BSD FMLA CKD-EPI: 143 ML/MIN/1.73
GLOBULIN SER CALC-MCNC: 2.6 GM/DL
GLUCOSE SERPL-MCNC: 278 MG/DL (ref 65–99)
HBA1C MFR BLD: 9.2 % (ref 4.8–5.6)
HCT VFR BLD AUTO: 51.9 % (ref 40.4–52.2)
HDLC SERPL-MCNC: 45 MG/DL (ref 40–60)
HGB BLD-MCNC: 17 G/DL (ref 13.7–17.6)
IMM GRANULOCYTES # BLD: 0.03 10*3/MM3 (ref 0–0.03)
IMM GRANULOCYTES NFR BLD: 0.4 % (ref 0–0.5)
INTERPRETATION: NORMAL
LDLC SERPL CALC-MCNC: ABNORMAL MG/DL
LH SERPL-ACNC: 5.8 MIU/ML (ref 1.7–8.6)
LYMPHOCYTES # BLD AUTO: 2.72 10*3/MM3 (ref 0.9–4.8)
LYMPHOCYTES NFR BLD AUTO: 32.5 % (ref 19.6–45.3)
Lab: NORMAL
MCH RBC QN AUTO: 32.5 PG (ref 27–32.7)
MCHC RBC AUTO-ENTMCNC: 32.8 G/DL (ref 32.6–36.4)
MCV RBC AUTO: 99.2 FL (ref 79.8–96.2)
MICROALBUMIN UR-MCNC: 105.7 UG/ML
MONOCYTES # BLD AUTO: 0.71 10*3/MM3 (ref 0.2–1.2)
MONOCYTES NFR BLD AUTO: 8.5 % (ref 5–12)
NEUTROPHILS # BLD AUTO: 4.77 10*3/MM3 (ref 1.9–8.1)
NEUTROPHILS NFR BLD AUTO: 56.9 % (ref 42.7–76)
PLATELET # BLD AUTO: 171 10*3/MM3 (ref 140–500)
POTASSIUM SERPL-SCNC: 4.6 MMOL/L (ref 3.5–5.2)
PROLACTIN SERPL-MCNC: 10.6 NG/ML (ref 4–15.2)
PROT SERPL-MCNC: 6.9 G/DL (ref 6–8.5)
RBC # BLD AUTO: 5.23 10*6/MM3 (ref 4.6–6)
SHBG SERPL-SCNC: 35.6 NMOL/L (ref 19.3–76.4)
SODIUM SERPL-SCNC: 139 MMOL/L (ref 136–145)
T3FREE SERPL-MCNC: 2.9 PG/ML (ref 2–4.4)
T4 FREE SERPL-MCNC: 1.36 NG/DL (ref 0.93–1.7)
T4 SERPL-MCNC: 6.3 MCG/DL (ref 4.5–11.7)
TESTOST FREE SERPL-MCNC: 19.6 PG/ML (ref 6.6–18.1)
TESTOST SERPL-MCNC: 224 NG/DL (ref 264–916)
THYROGLOB AB SERPL-ACNC: <1 IU/ML (ref 0–0.9)
THYROGLOB SERPL-MCNC: 26.6 NG/ML (ref 1.4–29.2)
TRIGL SERPL-MCNC: 684 MG/DL (ref 0–150)
TSH SERPL DL<=0.005 MIU/L-ACNC: 1.93 MIU/ML (ref 0.27–4.2)
URATE SERPL-MCNC: 5.3 MG/DL (ref 3.4–7)
VLDLC SERPL CALC-MCNC: ABNORMAL MG/DL
WBC # BLD AUTO: 8.37 10*3/MM3 (ref 4.5–10.7)

## 2018-03-29 RX ORDER — ROSUVASTATIN CALCIUM 20 MG/1
20 TABLET, COATED ORAL NIGHTLY
Qty: 30 TABLET | Refills: 11 | Status: SHIPPED | OUTPATIENT
Start: 2018-03-29 | End: 2018-11-02 | Stop reason: SDUPTHER

## 2018-03-29 RX ORDER — ICOSAPENT ETHYL 1000 MG/1
4 CAPSULE ORAL DAILY
Qty: 120 CAPSULE | Refills: 5 | Status: SHIPPED | OUTPATIENT
Start: 2018-03-29 | End: 2018-07-02 | Stop reason: SDUPTHER

## 2018-03-29 RX ORDER — ERGOCALCIFEROL 1.25 MG/1
50000 CAPSULE ORAL 2 TIMES WEEKLY
Qty: 26 CAPSULE | Refills: 3 | Status: SHIPPED | OUTPATIENT
Start: 2018-03-29 | End: 2018-07-02 | Stop reason: SDUPTHER

## 2018-05-02 ENCOUNTER — TELEPHONE (OUTPATIENT)
Dept: FAMILY MEDICINE CLINIC | Facility: CLINIC | Age: 63
End: 2018-05-02

## 2018-05-03 NOTE — TELEPHONE ENCOUNTER
Can you send in a new glucose monitor strips and lancets for this gentleman to his pharmacy.  Thank you

## 2018-06-12 RX ORDER — ALPRAZOLAM 0.25 MG/1
0.25 TABLET ORAL NIGHTLY PRN
Qty: 30 TABLET | Refills: 0 | OUTPATIENT
Start: 2018-06-12 | End: 2018-07-07 | Stop reason: SDUPTHER

## 2018-06-18 ENCOUNTER — RESULTS ENCOUNTER (OUTPATIENT)
Dept: ENDOCRINOLOGY | Age: 63
End: 2018-06-18

## 2018-06-18 DIAGNOSIS — E78.5 DYSLIPIDEMIA: ICD-10-CM

## 2018-06-18 DIAGNOSIS — Z71.6 TOBACCO ABUSE COUNSELING: ICD-10-CM

## 2018-06-18 DIAGNOSIS — I10 ESSENTIAL HYPERTENSION: ICD-10-CM

## 2018-06-18 DIAGNOSIS — E55.9 VITAMIN D DEFICIENCY: ICD-10-CM

## 2018-06-18 DIAGNOSIS — Z12.5 PROSTATE CANCER SCREENING: ICD-10-CM

## 2018-06-18 DIAGNOSIS — E78.00 PURE HYPERCHOLESTEROLEMIA: ICD-10-CM

## 2018-06-18 DIAGNOSIS — IMO0001 UNCONTROLLED TYPE 2 DIABETES MELLITUS WITHOUT COMPLICATION, WITHOUT LONG-TERM CURRENT USE OF INSULIN: ICD-10-CM

## 2018-06-18 DIAGNOSIS — E66.01 MORBID OBESITY (HCC): ICD-10-CM

## 2018-06-28 ENCOUNTER — OFFICE VISIT (OUTPATIENT)
Dept: ENDOCRINOLOGY | Age: 63
End: 2018-06-28

## 2018-06-28 VITALS
HEIGHT: 68 IN | DIASTOLIC BLOOD PRESSURE: 84 MMHG | BODY MASS INDEX: 46.38 KG/M2 | SYSTOLIC BLOOD PRESSURE: 128 MMHG | WEIGHT: 306 LBS

## 2018-06-28 DIAGNOSIS — IMO0001 UNCONTROLLED TYPE 2 DIABETES MELLITUS WITHOUT COMPLICATION, WITHOUT LONG-TERM CURRENT USE OF INSULIN: Primary | ICD-10-CM

## 2018-06-28 DIAGNOSIS — E78.00 PURE HYPERCHOLESTEROLEMIA: ICD-10-CM

## 2018-06-28 DIAGNOSIS — E78.5 DYSLIPIDEMIA: ICD-10-CM

## 2018-06-28 DIAGNOSIS — E66.01 MORBID OBESITY (HCC): ICD-10-CM

## 2018-06-28 DIAGNOSIS — I10 ESSENTIAL HYPERTENSION: ICD-10-CM

## 2018-06-28 DIAGNOSIS — E55.9 VITAMIN D DEFICIENCY: ICD-10-CM

## 2018-06-28 PROCEDURE — 99214 OFFICE O/P EST MOD 30 MIN: CPT | Performed by: NURSE PRACTITIONER

## 2018-06-29 LAB
25(OH)D3+25(OH)D2 SERPL-MCNC: 10.3 NG/ML (ref 30–100)
ALBUMIN SERPL-MCNC: 4.8 G/DL (ref 3.5–5.2)
ALBUMIN/GLOB SERPL: 2.1 G/DL
ALP SERPL-CCNC: 55 U/L (ref 39–117)
ALT SERPL-CCNC: 18 U/L (ref 1–41)
AST SERPL-CCNC: 13 U/L (ref 1–40)
BASOPHILS # BLD AUTO: 0.03 10*3/MM3 (ref 0–0.2)
BASOPHILS NFR BLD AUTO: 0.3 % (ref 0–1.5)
BILIRUB SERPL-MCNC: 0.4 MG/DL (ref 0.1–1.2)
BUN SERPL-MCNC: 11 MG/DL (ref 8–23)
BUN/CREAT SERPL: 15.5 (ref 7–25)
C PEPTIDE SERPL-MCNC: 4 NG/ML (ref 1.1–4.4)
CALCIUM SERPL-MCNC: 10.1 MG/DL (ref 8.6–10.5)
CHLORIDE SERPL-SCNC: 98 MMOL/L (ref 98–107)
CHOLEST SERPL-MCNC: 163 MG/DL (ref 0–200)
CO2 SERPL-SCNC: 27.9 MMOL/L (ref 22–29)
CREAT SERPL-MCNC: 0.71 MG/DL (ref 0.76–1.27)
EOSINOPHIL # BLD AUTO: 0.12 10*3/MM3 (ref 0–0.7)
EOSINOPHIL NFR BLD AUTO: 1.4 % (ref 0.3–6.2)
ERYTHROCYTE [DISTWIDTH] IN BLOOD BY AUTOMATED COUNT: 15.1 % (ref 11.5–14.5)
FRUCTOSAMINE SERPL-SCNC: 296 UMOL/L (ref 0–285)
GLOBULIN SER CALC-MCNC: 2.3 GM/DL
GLUCOSE SERPL-MCNC: 150 MG/DL (ref 65–99)
HBA1C MFR BLD: 9.1 % (ref 4.8–5.6)
HCT VFR BLD AUTO: 53.7 % (ref 40.4–52.2)
HDLC SERPL-MCNC: 45 MG/DL (ref 40–60)
HGB BLD-MCNC: 16.7 G/DL (ref 13.7–17.6)
IMM GRANULOCYTES # BLD: 0.04 10*3/MM3 (ref 0–0.03)
IMM GRANULOCYTES NFR BLD: 0.5 % (ref 0–0.5)
INSULIN SERPL-ACNC: 9.2 UIU/ML (ref 2.6–24.9)
INTERPRETATION: NORMAL
LDLC SERPL CALC-MCNC: 75 MG/DL (ref 0–100)
LYMPHOCYTES # BLD AUTO: 2.18 10*3/MM3 (ref 0.9–4.8)
LYMPHOCYTES NFR BLD AUTO: 25.4 % (ref 19.6–45.3)
Lab: NORMAL
MCH RBC QN AUTO: 32.2 PG (ref 27–32.7)
MCHC RBC AUTO-ENTMCNC: 31.1 G/DL (ref 32.6–36.4)
MCV RBC AUTO: 103.7 FL (ref 79.8–96.2)
MICROALBUMIN UR-MCNC: 27 UG/ML
MONOCYTES # BLD AUTO: 0.75 10*3/MM3 (ref 0.2–1.2)
MONOCYTES NFR BLD AUTO: 8.7 % (ref 5–12)
NEUTROPHILS # BLD AUTO: 5.46 10*3/MM3 (ref 1.9–8.1)
NEUTROPHILS NFR BLD AUTO: 63.7 % (ref 42.7–76)
PLATELET # BLD AUTO: 131 10*3/MM3 (ref 140–500)
POTASSIUM SERPL-SCNC: 4 MMOL/L (ref 3.5–5.2)
PROT SERPL-MCNC: 7.1 G/DL (ref 6–8.5)
RBC # BLD AUTO: 5.18 10*6/MM3 (ref 4.6–6)
SODIUM SERPL-SCNC: 143 MMOL/L (ref 136–145)
T3FREE SERPL-MCNC: 2.9 PG/ML (ref 2–4.4)
T4 FREE SERPL-MCNC: 1.26 NG/DL (ref 0.93–1.7)
THYROGLOB AB SERPL-ACNC: <1 IU/ML (ref 0–0.9)
THYROPEROXIDASE AB SERPL-ACNC: 8 IU/ML (ref 0–34)
TRIGL SERPL-MCNC: 216 MG/DL (ref 0–150)
TSH SERPL DL<=0.005 MIU/L-ACNC: 2.55 MIU/ML (ref 0.27–4.2)
URATE SERPL-MCNC: 4.8 MG/DL (ref 3.4–7)
VLDLC SERPL CALC-MCNC: 43.2 MG/DL (ref 5–40)
WBC # BLD AUTO: 8.58 10*3/MM3 (ref 4.5–10.7)

## 2018-07-02 DIAGNOSIS — E78.5 DYSLIPIDEMIA: Primary | ICD-10-CM

## 2018-07-02 DIAGNOSIS — E55.9 VITAMIN D DEFICIENCY: ICD-10-CM

## 2018-07-02 RX ORDER — ICOSAPENT ETHYL 1000 MG/1
2 CAPSULE ORAL 2 TIMES DAILY WITH MEALS
Qty: 120 CAPSULE | Refills: 5 | Status: SHIPPED | OUTPATIENT
Start: 2018-07-02 | End: 2018-11-02 | Stop reason: SDUPTHER

## 2018-07-02 RX ORDER — ERGOCALCIFEROL 1.25 MG/1
50000 CAPSULE ORAL 2 TIMES WEEKLY
Qty: 26 CAPSULE | Refills: 3 | Status: SHIPPED | OUTPATIENT
Start: 2018-07-02 | End: 2018-11-02 | Stop reason: SDUPTHER

## 2018-07-06 ENCOUNTER — TELEPHONE (OUTPATIENT)
Dept: FAMILY MEDICINE CLINIC | Facility: CLINIC | Age: 63
End: 2018-07-06

## 2018-07-06 NOTE — TELEPHONE ENCOUNTER
[facility], CPAP/ Mask.    Dr. Mann  filled out a forms prior to leaving ( on 04/09/2018) to have the patients CPAP machine updated. [facility] is now requesting that the form be updated and the patient O2 level be documented on line 1 B and Dr. Mann initial the order. I have called [facility] and informed them Dr. Mann is no longer working for [facility] and he has moved. The patient at this time does not have a new PCP listed and the current providers at this location has not seen this patient.

## 2018-07-09 RX ORDER — ALPRAZOLAM 0.25 MG/1
TABLET ORAL
Qty: 30 TABLET | Refills: 0 | Status: SHIPPED | OUTPATIENT
Start: 2018-07-09

## 2018-07-09 RX ORDER — LISINOPRIL 20 MG/1
20 TABLET ORAL DAILY
Qty: 90 TABLET | Refills: 1 | Status: SHIPPED | OUTPATIENT
Start: 2018-07-09 | End: 2018-12-30 | Stop reason: SDUPTHER

## 2018-07-12 RX ORDER — BLOOD SUGAR DIAGNOSTIC
STRIP MISCELLANEOUS
Qty: 100 EACH | Refills: 5 | Status: SHIPPED | OUTPATIENT
Start: 2018-07-12 | End: 2018-07-23 | Stop reason: SDUPTHER

## 2018-07-23 RX ORDER — BLOOD SUGAR DIAGNOSTIC
STRIP MISCELLANEOUS
Qty: 100 EACH | Refills: 5 | Status: SHIPPED | OUTPATIENT
Start: 2018-07-23 | End: 2018-11-02 | Stop reason: SDUPTHER

## 2018-08-31 RX ORDER — ALPRAZOLAM 0.25 MG/1
TABLET ORAL
Qty: 30 TABLET | Refills: 0 | OUTPATIENT
Start: 2018-08-31

## 2018-10-02 ENCOUNTER — RESULTS ENCOUNTER (OUTPATIENT)
Dept: ENDOCRINOLOGY | Age: 63
End: 2018-10-02

## 2018-10-02 DIAGNOSIS — I10 ESSENTIAL HYPERTENSION: ICD-10-CM

## 2018-10-02 DIAGNOSIS — E66.01 MORBID OBESITY (HCC): ICD-10-CM

## 2018-10-02 DIAGNOSIS — E55.9 VITAMIN D DEFICIENCY: ICD-10-CM

## 2018-10-02 DIAGNOSIS — E78.5 DYSLIPIDEMIA: ICD-10-CM

## 2018-10-02 DIAGNOSIS — E78.00 PURE HYPERCHOLESTEROLEMIA: ICD-10-CM

## 2018-10-02 DIAGNOSIS — IMO0001 UNCONTROLLED TYPE 2 DIABETES MELLITUS WITHOUT COMPLICATION, WITHOUT LONG-TERM CURRENT USE OF INSULIN: ICD-10-CM

## 2018-10-08 DIAGNOSIS — E55.9 VITAMIN D DEFICIENCY: ICD-10-CM

## 2018-10-08 DIAGNOSIS — E11.65 UNCONTROLLED TYPE 2 DIABETES MELLITUS WITH HYPERGLYCEMIA (HCC): ICD-10-CM

## 2018-10-08 DIAGNOSIS — E78.5 DYSLIPIDEMIA: Primary | ICD-10-CM

## 2018-10-08 DIAGNOSIS — E78.00 PURE HYPERCHOLESTEROLEMIA: ICD-10-CM

## 2018-10-19 ENCOUNTER — LAB (OUTPATIENT)
Dept: ENDOCRINOLOGY | Age: 63
End: 2018-10-19

## 2018-10-19 DIAGNOSIS — E78.00 PURE HYPERCHOLESTEROLEMIA: ICD-10-CM

## 2018-10-19 DIAGNOSIS — E78.5 DYSLIPIDEMIA: ICD-10-CM

## 2018-10-19 DIAGNOSIS — E55.9 VITAMIN D DEFICIENCY: ICD-10-CM

## 2018-10-19 DIAGNOSIS — E11.65 UNCONTROLLED TYPE 2 DIABETES MELLITUS WITH HYPERGLYCEMIA (HCC): ICD-10-CM

## 2018-10-20 LAB
25(OH)D3+25(OH)D2 SERPL-MCNC: 35 NG/ML (ref 30–100)
ALBUMIN SERPL-MCNC: 4.5 G/DL (ref 3.5–5.2)
ALBUMIN/GLOB SERPL: 1.8 G/DL
ALP SERPL-CCNC: 72 U/L (ref 39–117)
ALT SERPL-CCNC: 30 U/L (ref 1–41)
AST SERPL-CCNC: 29 U/L (ref 1–40)
BILIRUB SERPL-MCNC: 0.5 MG/DL (ref 0.1–1.2)
BUN SERPL-MCNC: 14 MG/DL (ref 8–23)
BUN/CREAT SERPL: 24.1 (ref 7–25)
C PEPTIDE SERPL-MCNC: 3.7 NG/ML (ref 1.1–4.4)
CALCIUM SERPL-MCNC: 10.1 MG/DL (ref 8.6–10.5)
CHLORIDE SERPL-SCNC: 98 MMOL/L (ref 98–107)
CHOLEST SERPL-MCNC: 133 MG/DL (ref 0–200)
CO2 SERPL-SCNC: 28.3 MMOL/L (ref 22–29)
CREAT SERPL-MCNC: 0.58 MG/DL (ref 0.76–1.27)
GLOBULIN SER CALC-MCNC: 2.5 GM/DL
GLUCOSE SERPL-MCNC: 158 MG/DL (ref 65–99)
HBA1C MFR BLD: 7.3 % (ref 4.8–5.6)
HDLC SERPL-MCNC: 51 MG/DL (ref 40–60)
INTERPRETATION: NORMAL
LDLC SERPL CALC-MCNC: 23 MG/DL (ref 0–100)
Lab: NORMAL
MICROALBUMIN UR-MCNC: 173.9 UG/ML
POTASSIUM SERPL-SCNC: 4.3 MMOL/L (ref 3.5–5.2)
PROT SERPL-MCNC: 7 G/DL (ref 6–8.5)
SODIUM SERPL-SCNC: 139 MMOL/L (ref 136–145)
T4 SERPL-MCNC: 6.01 MCG/DL (ref 4.5–11.7)
TRIGL SERPL-MCNC: 294 MG/DL (ref 0–150)
TSH SERPL DL<=0.005 MIU/L-ACNC: 2.45 MIU/ML (ref 0.27–4.2)
URATE SERPL-MCNC: 3.2 MG/DL (ref 3.4–7)
VLDLC SERPL CALC-MCNC: 58.8 MG/DL (ref 5–40)

## 2018-10-29 RX ORDER — ALPRAZOLAM 0.25 MG/1
TABLET ORAL
Qty: 30 TABLET | Refills: 0 | OUTPATIENT
Start: 2018-10-29

## 2018-11-02 ENCOUNTER — OFFICE VISIT (OUTPATIENT)
Dept: ENDOCRINOLOGY | Age: 63
End: 2018-11-02

## 2018-11-02 VITALS
RESPIRATION RATE: 16 BRPM | SYSTOLIC BLOOD PRESSURE: 146 MMHG | DIASTOLIC BLOOD PRESSURE: 94 MMHG | HEIGHT: 69 IN | WEIGHT: 283 LBS | BODY MASS INDEX: 41.92 KG/M2

## 2018-11-02 DIAGNOSIS — IMO0001 UNCONTROLLED TYPE 2 DIABETES MELLITUS WITHOUT COMPLICATION, WITHOUT LONG-TERM CURRENT USE OF INSULIN: ICD-10-CM

## 2018-11-02 DIAGNOSIS — E78.5 DYSLIPIDEMIA: ICD-10-CM

## 2018-11-02 DIAGNOSIS — E66.01 MORBID OBESITY (HCC): ICD-10-CM

## 2018-11-02 DIAGNOSIS — E55.9 VITAMIN D DEFICIENCY: ICD-10-CM

## 2018-11-02 DIAGNOSIS — E11.65 UNCONTROLLED TYPE 2 DIABETES MELLITUS WITH HYPERGLYCEMIA (HCC): Primary | ICD-10-CM

## 2018-11-02 DIAGNOSIS — Z71.6 TOBACCO ABUSE COUNSELING: ICD-10-CM

## 2018-11-02 DIAGNOSIS — E78.2 MIXED HYPERLIPIDEMIA: ICD-10-CM

## 2018-11-02 DIAGNOSIS — I10 ESSENTIAL HYPERTENSION: ICD-10-CM

## 2018-11-02 DIAGNOSIS — E78.00 PURE HYPERCHOLESTEROLEMIA: ICD-10-CM

## 2018-11-02 DIAGNOSIS — Z12.5 PROSTATE CANCER SCREENING: ICD-10-CM

## 2018-11-02 PROCEDURE — 99214 OFFICE O/P EST MOD 30 MIN: CPT | Performed by: INTERNAL MEDICINE

## 2018-11-02 RX ORDER — DAPAGLIFLOZIN 10 MG/1
TABLET, FILM COATED ORAL
Qty: 30 TABLET | Refills: 5 | Status: SHIPPED | OUTPATIENT
Start: 2018-11-02 | End: 2019-02-25 | Stop reason: SDUPTHER

## 2018-11-02 RX ORDER — ERGOCALCIFEROL 1.25 MG/1
50000 CAPSULE ORAL 2 TIMES WEEKLY
Qty: 26 CAPSULE | Refills: 3 | Status: SHIPPED | OUTPATIENT
Start: 2018-11-05 | End: 2019-03-06 | Stop reason: SDUPTHER

## 2018-11-02 RX ORDER — BLOOD SUGAR DIAGNOSTIC
STRIP MISCELLANEOUS
Qty: 100 EACH | Refills: 5 | Status: SHIPPED | OUTPATIENT
Start: 2018-11-02 | End: 2019-10-11 | Stop reason: SDUPTHER

## 2018-11-02 RX ORDER — SITAGLIPTIN AND METFORMIN HYDROCHLORIDE 1000; 50 MG/1; MG/1
2 TABLET, FILM COATED, EXTENDED RELEASE ORAL
Qty: 60 TABLET | Refills: 5 | Status: SHIPPED | OUTPATIENT
Start: 2018-11-02 | End: 2019-03-06 | Stop reason: SDUPTHER

## 2018-11-02 RX ORDER — GLIMEPIRIDE 2 MG/1
2 TABLET ORAL
Qty: 180 TABLET | Refills: 1 | Status: SHIPPED | OUTPATIENT
Start: 2018-11-02 | End: 2019-03-06

## 2018-11-02 RX ORDER — EXENATIDE 2 MG/.85ML
2 INJECTION, SUSPENSION, EXTENDED RELEASE SUBCUTANEOUS WEEKLY
Qty: 4 PEN | Refills: 5 | Status: SHIPPED | OUTPATIENT
Start: 2018-11-02 | End: 2019-03-04 | Stop reason: SDUPTHER

## 2018-11-02 RX ORDER — ICOSAPENT ETHYL 1000 MG/1
2 CAPSULE ORAL 2 TIMES DAILY WITH MEALS
Qty: 360 CAPSULE | Refills: 3 | Status: SHIPPED | OUTPATIENT
Start: 2018-11-02 | End: 2019-03-06 | Stop reason: SDUPTHER

## 2018-11-02 RX ORDER — ROSUVASTATIN CALCIUM 20 MG/1
20 TABLET, COATED ORAL NIGHTLY
Qty: 30 TABLET | Refills: 11 | Status: SHIPPED | OUTPATIENT
Start: 2018-11-02 | End: 2019-06-20 | Stop reason: SDUPTHER

## 2018-11-02 NOTE — PROGRESS NOTES
"Subjective   Dell Thorpe is a 63 y.o. male seen for follow up for Dm2, hyperlipidemia, lab review. Patient states that his PCP has retired and he is needing a refill of his Alprazolam. He states that his PCP office is not calling him back. He is not checking his BG due to supplies being too expensive. He denies any problems or concerns.     History of Present Illness this is a 63-year-old gentleman known patient with type II diabetes hypertension and dyslipidemia as well as vitamin D deficiency and hyperuricemia with morbid obesity.  Over the course of last 66 months he has had no significant health problems for which to go to the emergency room or hospital.    /94   Resp 16   Ht 175.3 cm (69\")   Wt 128 kg (283 lb)   BMI 41.79 kg/m²      Allergies   Allergen Reactions   • Penicillins        Current Outpatient Prescriptions:   •  albuterol (PROVENTIL HFA;VENTOLIN HFA) 108 (90 BASE) MCG/ACT inhaler, Proventil  (90 Base) MCG/ACT Inhalation Aerosol Solution; Patient Sig: Proventil  (90 Base) MCG/ACT Inhalation Aerosol Solution INHALE 1 TO 2 PUFFS EVERY 4 TO 6 HOURS AS NEEDED.; 1; 5; 04-Dec-2015; Active, Disp: , Rfl:   •  ALPRAZolam (XANAX) 0.25 MG tablet, TAKE 1 TABLET BY MOUTH ONCE DAILY AT BEDTIME AS NEEDED FOR ANXIETY, Disp: 30 tablet, Rfl: 0  •  BYDUREON BCISE 2 MG/0.85ML auto-injector injection, Inject 0.85 mL under the skin 1 (One) Time Per Week., Disp: 4 pen, Rfl: 5  •  ergocalciferol (DRISDOL) 16239 units capsule, Take 1 capsule by mouth 2 (Two) Times a Week., Disp: 26 capsule, Rfl: 3  •  FARXIGA 10 MG tablet, 1 tablet every morning, Disp: 30 tablet, Rfl: 5  •  glimepiride (AMARYL) 2 MG tablet, Take 1 tablet by mouth Every Morning Before Breakfast., Disp: 180 tablet, Rfl: 1  •  hydrochlorothiazide (HYDRODIURIL) 25 MG tablet, Take 1 tablet by mouth Daily., Disp: 90 tablet, Rfl: 1  •  JANUMET XR  MG tablet, Take 2 tablets by mouth Daily With Dinner., Disp: 60 tablet, Rfl: 5  •  " lisinopril (PRINIVIL,ZESTRIL) 20 MG tablet, Take 1 tablet by mouth Daily., Disp: 90 tablet, Rfl: 1  •  ONETOUCH DELICA LANCETS FINE misc, Test 3 times daily DX: e11.65, Disp: 100 each, Rfl: 5  •  ONETOUCH VERIO test strip, Test 3 times daily DX: e11.65, Disp: 100 each, Rfl: 5  •  rosuvastatin (CRESTOR) 20 MG tablet, Take 1 tablet by mouth Every Night., Disp: 30 tablet, Rfl: 11  •  SABRA-24 200 MG 24 hr capsule, Take 1 capsule by mouth Daily., Disp: 30 capsule, Rfl: 1  •  VASCEPA 1 g capsule capsule, Take 2 g by mouth 2 (Two) Times a Day With Meals., Disp: 120 capsule, Rfl: 5      The following portions of the patient's history were reviewed and updated as appropriate: allergies, current medications, past family history, past medical history, past social history, past surgical history and problem list.    Review of Systems   Constitutional: Negative.    HENT: Negative.    Eyes: Negative.    Respiratory: Negative.    Cardiovascular: Negative.    Gastrointestinal: Negative.    Endocrine: Negative.    Genitourinary: Negative.    Musculoskeletal: Negative.    Skin: Negative.    Allergic/Immunologic: Negative.    Neurological: Negative.    Hematological: Negative.    Psychiatric/Behavioral: Negative.        Objective   Physical Exam   Constitutional: He is oriented to person, place, and time. He appears well-developed and well-nourished. No distress.   Morbidly obese and almost constantly coughing.   HENT:   Head: Normocephalic and atraumatic.   Right Ear: External ear normal.   Left Ear: External ear normal.   Nose: Nose normal.   Mouth/Throat: Oropharynx is clear and moist. No oropharyngeal exudate.   Eyes: Pupils are equal, round, and reactive to light. Conjunctivae and EOM are normal. Right eye exhibits no discharge. Left eye exhibits no discharge. No scleral icterus.   Neck: Normal range of motion. Neck supple. No JVD present. No tracheal deviation present. No thyromegaly present.   Cardiovascular: Normal rate,  regular rhythm, normal heart sounds, intact distal pulses and normal pulses.  Exam reveals no gallop and no friction rub.    No murmur heard.  Pulmonary/Chest: Effort normal. No stridor. No respiratory distress. He has wheezes. He has no rales. He exhibits no tenderness.   Both inspiratory and expiratory wheezing   Abdominal: Soft. Bowel sounds are normal. He exhibits no distension and no mass. There is no tenderness. There is no rebound and no guarding. No hernia.   Massive  abdomen   Musculoskeletal: Normal range of motion. He exhibits no edema, tenderness or deformity.   Lymphadenopathy:     He has no cervical adenopathy.   Neurological: He is alert and oriented to person, place, and time. He has normal reflexes. He displays normal reflexes. No cranial nerve deficit or sensory deficit. He exhibits normal muscle tone. Coordination normal.   Skin: Skin is warm and dry. No rash noted. He is not diaphoretic. No erythema. No pallor.   Reddish face and eyes and the rosacea on both cheeks.  Skin tags and acanthosis nigricans around his neck indicating significant degree of insulin resistance   Psychiatric: He has a normal mood and affect. His behavior is normal. Judgment and thought content normal.   Nursing note and vitals reviewed.       Results for orders placed or performed in visit on 10/19/18   Comprehensive Metabolic Panel   Result Value Ref Range    Glucose 158 (H) 65 - 99 mg/dL    BUN 14 8 - 23 mg/dL    Creatinine 0.58 (L) 0.76 - 1.27 mg/dL    eGFR Non African Am 142 >60 mL/min/1.73    eGFR African Am >150 >60 mL/min/1.73    BUN/Creatinine Ratio 24.1 7.0 - 25.0    Sodium 139 136 - 145 mmol/L    Potassium 4.3 3.5 - 5.2 mmol/L    Chloride 98 98 - 107 mmol/L    Total CO2 28.3 22.0 - 29.0 mmol/L    Calcium 10.1 8.6 - 10.5 mg/dL    Total Protein 7.0 6.0 - 8.5 g/dL    Albumin 4.50 3.50 - 5.20 g/dL    Globulin 2.5 gm/dL    A/G Ratio 1.8 g/dL    Total Bilirubin 0.5 0.1 - 1.2 mg/dL    Alkaline Phosphatase 72 39 - 117  U/L    AST (SGOT) 29 1 - 40 U/L    ALT (SGPT) 30 1 - 41 U/L   C-Peptide   Result Value Ref Range    C-Peptide 3.7 1.1 - 4.4 ng/mL   Hemoglobin A1c   Result Value Ref Range    Hemoglobin A1C 7.30 (H) 4.80 - 5.60 %   Lipid Panel   Result Value Ref Range    Total Cholesterol 133 0 - 200 mg/dL    Triglycerides 294 (H) 0 - 150 mg/dL    HDL Cholesterol 51 40 - 60 mg/dL    VLDL Cholesterol 58.8 (H) 5 - 40 mg/dL    LDL Cholesterol  23 0 - 100 mg/dL   MicroAlbumin, Urine, Random - Urine, Clean Catch   Result Value Ref Range    Microalbumin, Urine 173.9 Not Estab. ug/mL   Uric Acid   Result Value Ref Range    Uric Acid 3.2 (L) 3.4 - 7.0 mg/dL   Vitamin D 25 Hydroxy   Result Value Ref Range    25 Hydroxy, Vitamin D 35.0 30.0 - 100.0 ng/ml   T4 & TSH ([facility])   Result Value Ref Range    TSH 2.450 0.270 - 4.200 mIU/mL    T4, Total 6.01 4.50 - 11.70 mcg/dL   Cardiovascular Risk Assessment   Result Value Ref Range    Interpretation Note    Diabetes Patient Education   Result Value Ref Range    PDF Image Not applicable          Assessment/Plan   Diagnoses and all orders for this visit:    Uncontrolled type 2 diabetes mellitus with hyperglycemia (CMS/[facility])  -     T4 & TSH ([facility]); Future  -     Uric Acid; Future  -     Vitamin D 25 Hydroxy; Future  -     T3, Free; Future  -     T4, Free; Future  -     Comprehensive Metabolic Panel; Future  -     C-Peptide; Future  -     Hemoglobin A1c; Future  -     Lipid Panel; Future  -     MicroAlbumin, Urine, Random - Urine, Clean Catch; Future    Essential hypertension  -     T4 & TSH ([facility]); Future  -     Uric Acid; Future  -     Vitamin D 25 Hydroxy; Future  -     T3, Free; Future  -     T4, Free; Future  -     Comprehensive Metabolic Panel; Future  -     C-Peptide; Future  -     Hemoglobin A1c; Future  -     Lipid Panel; Future  -     MicroAlbumin, Urine, Random - Urine, Clean Catch; Future  -     JANUMET XR  MG tablet; Take 2 tablets by mouth Daily With Dinner.  -     FARXIGA 10  MG tablet; 1 tablet every morning  -     BYDUREON BCISE 2 MG/0.85ML auto-injector injection; Inject 0.85 mL under the skin into the appropriate area as directed 1 (One) Time Per Week.    Mixed hyperlipidemia  -     T4 & TSH ([facility]); Future  -     Uric Acid; Future  -     Vitamin D 25 Hydroxy; Future  -     T3, Free; Future  -     T4, Free; Future  -     Comprehensive Metabolic Panel; Future  -     C-Peptide; Future  -     Hemoglobin A1c; Future  -     Lipid Panel; Future  -     MicroAlbumin, Urine, Random - Urine, Clean Catch; Future    Morbid obesity (CMS/HCC)  -     T4 & TSH ([facility]); Future  -     Uric Acid; Future  -     Vitamin D 25 Hydroxy; Future  -     T3, Free; Future  -     T4, Free; Future  -     Comprehensive Metabolic Panel; Future  -     C-Peptide; Future  -     Hemoglobin A1c; Future  -     Lipid Panel; Future  -     MicroAlbumin, Urine, Random - Urine, Clean Catch; Future  -     JANUMET XR  MG tablet; Take 2 tablets by mouth Daily With Dinner.  -     FARXIGA 10 MG tablet; 1 tablet every morning  -     BYDUREON BCISE 2 MG/0.85ML auto-injector injection; Inject 0.85 mL under the skin into the appropriate area as directed 1 (One) Time Per Week.    Vitamin D deficiency  -     T4 & TSH ([facility]); Future  -     Uric Acid; Future  -     Vitamin D 25 Hydroxy; Future  -     T3, Free; Future  -     T4, Free; Future  -     Comprehensive Metabolic Panel; Future  -     C-Peptide; Future  -     Hemoglobin A1c; Future  -     Lipid Panel; Future  -     MicroAlbumin, Urine, Random - Urine, Clean Catch; Future  -     JANUMET XR  MG tablet; Take 2 tablets by mouth Daily With Dinner.  -     FARXIGA 10 MG tablet; 1 tablet every morning  -     ergocalciferol (DRISDOL) 92657 units capsule; Take 1 capsule by mouth 2 (Two) Times a Week.  -     BYDUREON BCISE 2 MG/0.85ML auto-injector injection; Inject 0.85 mL under the skin into the appropriate area as directed 1 (One) Time Per Week.    Dyslipidemia  -      VASCEPA 1 g capsule capsule; Take 2 g by mouth 2 (Two) Times a Day With Meals.  -     JANUMET XR  MG tablet; Take 2 tablets by mouth Daily With Dinner.  -     FARXIGA 10 MG tablet; 1 tablet every morning  -     BYDUREON BCISE 2 MG/0.85ML auto-injector injection; Inject 0.85 mL under the skin into the appropriate area as directed 1 (One) Time Per Week.    Uncontrolled type 2 diabetes mellitus without complication, without long-term current use of insulin (CMS/[facility])  -     JANUMET XR  MG tablet; Take 2 tablets by mouth Daily With Dinner.  -     FARXIGA 10 MG tablet; 1 tablet every morning  -     BYDUREON BCISE 2 MG/0.85ML auto-injector injection; Inject 0.85 mL under the skin into the appropriate area as directed 1 (One) Time Per Week.    Pure hypercholesterolemia  -     JANUMET XR  MG tablet; Take 2 tablets by mouth Daily With Dinner.  -     FARXIGA 10 MG tablet; 1 tablet every morning  -     BYDUREON BCISE 2 MG/0.85ML auto-injector injection; Inject 0.85 mL under the skin into the appropriate area as directed 1 (One) Time Per Week.    Prostate cancer screening  -     JANUMET XR  MG tablet; Take 2 tablets by mouth Daily With Dinner.  -     FARXIGA 10 MG tablet; 1 tablet every morning  -     BYDUREON BCISE 2 MG/0.85ML auto-injector injection; Inject 0.85 mL under the skin into the appropriate area as directed 1 (One) Time Per Week.    Tobacco abuse counseling    Other orders  -     rosuvastatin (CRESTOR) 20 MG tablet; Take 1 tablet by mouth Every Night.  -     glimepiride (AMARYL) 2 MG tablet; Take 1 tablet by mouth Every Morning Before Breakfast.  -     ONETOUCH VERIO test strip; Test 3 times daily DX: e11.65  -     ONETOUCH DELICA LANCETS FINE misc; Test 3 times daily DX: e11.65               In summary I saw and examined this 63-year-old gentleman for above-mentioned problems.  I reviewed his laboratory evaluation of 10/19/2018 and provided him with a hard copy of it.  Overall he is  clinically and metabolically stable and therefore we will go ahead and continue all his current prescriptions.  I also encouraged him to make sure now that he has dropped his hemoglobin A1c as well as his weight and the swelling on both ankles are all gone the best thing he can do is to quit smoking and he did not seem to be making any commitment in that area.  He also told me that he has been out of alprazolam for about 10 days and since nothing has changed and he is feeling okay I convinced him not to continue it anymore and he agreed with it.He will see Ms. Mirlande Lorena in 4 months or sooner if needed with laboratory evaluation prior to each office visit.

## 2018-12-31 RX ORDER — LISINOPRIL 20 MG/1
TABLET ORAL
Qty: 90 TABLET | Refills: 1 | Status: SHIPPED | OUTPATIENT
Start: 2018-12-31 | End: 2019-07-24 | Stop reason: SDUPTHER

## 2019-02-18 ENCOUNTER — RESULTS ENCOUNTER (OUTPATIENT)
Dept: ENDOCRINOLOGY | Age: 64
End: 2019-02-18

## 2019-02-18 DIAGNOSIS — E78.2 MIXED HYPERLIPIDEMIA: ICD-10-CM

## 2019-02-18 DIAGNOSIS — E66.01 MORBID OBESITY (HCC): ICD-10-CM

## 2019-02-18 DIAGNOSIS — E11.65 UNCONTROLLED TYPE 2 DIABETES MELLITUS WITH HYPERGLYCEMIA (HCC): ICD-10-CM

## 2019-02-18 DIAGNOSIS — I10 ESSENTIAL HYPERTENSION: ICD-10-CM

## 2019-02-18 DIAGNOSIS — E55.9 VITAMIN D DEFICIENCY: ICD-10-CM

## 2019-02-20 ENCOUNTER — LAB (OUTPATIENT)
Dept: ENDOCRINOLOGY | Age: 64
End: 2019-02-20

## 2019-02-20 DIAGNOSIS — IMO0001 UNCONTROLLED TYPE 2 DIABETES MELLITUS WITHOUT COMPLICATION, WITHOUT LONG-TERM CURRENT USE OF INSULIN: ICD-10-CM

## 2019-02-21 LAB
25(OH)D3+25(OH)D2 SERPL-MCNC: 49.8 NG/ML (ref 30–100)
ALBUMIN SERPL-MCNC: 4.5 G/DL (ref 3.6–4.8)
ALBUMIN/GLOB SERPL: 2 {RATIO} (ref 1.2–2.2)
ALP SERPL-CCNC: 55 IU/L (ref 39–117)
ALT SERPL-CCNC: 30 IU/L (ref 0–44)
AST SERPL-CCNC: 29 IU/L (ref 0–40)
BILIRUB SERPL-MCNC: 0.7 MG/DL (ref 0–1.2)
BUN SERPL-MCNC: 13 MG/DL (ref 8–27)
BUN/CREAT SERPL: 20 (ref 10–24)
C PEPTIDE SERPL-MCNC: 3.3 NG/ML (ref 1.1–4.4)
CALCIUM SERPL-MCNC: 9.7 MG/DL (ref 8.6–10.2)
CHLORIDE SERPL-SCNC: 99 MMOL/L (ref 96–106)
CHOLEST SERPL-MCNC: 137 MG/DL (ref 100–199)
CO2 SERPL-SCNC: 25 MMOL/L (ref 20–29)
CREAT SERPL-MCNC: 0.65 MG/DL (ref 0.76–1.27)
GLOBULIN SER CALC-MCNC: 2.3 G/DL (ref 1.5–4.5)
GLUCOSE SERPL-MCNC: 143 MG/DL (ref 65–99)
HBA1C MFR BLD: 6.6 % (ref 4.8–5.6)
HDLC SERPL-MCNC: 64 MG/DL
INTERPRETATION: NORMAL
LDLC SERPL CALC-MCNC: 24 MG/DL (ref 0–99)
Lab: NORMAL
MICROALBUMIN UR-MCNC: 72.5 UG/ML
POTASSIUM SERPL-SCNC: 3.8 MMOL/L (ref 3.5–5.2)
PROT SERPL-MCNC: 6.8 G/DL (ref 6–8.5)
SODIUM SERPL-SCNC: 141 MMOL/L (ref 134–144)
T3FREE SERPL-MCNC: 3.7 PG/ML (ref 2–4.4)
T4 FREE SERPL-MCNC: 1.39 NG/DL (ref 0.82–1.77)
T4 SERPL-MCNC: 6.5 UG/DL (ref 4.5–12)
TRIGL SERPL-MCNC: 247 MG/DL (ref 0–149)
TSH SERPL DL<=0.005 MIU/L-ACNC: 3.76 UIU/ML (ref 0.45–4.5)
URATE SERPL-MCNC: 4 MG/DL (ref 3.7–8.6)
VLDLC SERPL CALC-MCNC: 49 MG/DL (ref 5–40)

## 2019-02-25 DIAGNOSIS — Z12.5 PROSTATE CANCER SCREENING: ICD-10-CM

## 2019-02-25 DIAGNOSIS — E55.9 VITAMIN D DEFICIENCY: ICD-10-CM

## 2019-02-25 DIAGNOSIS — IMO0001 UNCONTROLLED TYPE 2 DIABETES MELLITUS WITHOUT COMPLICATION, WITHOUT LONG-TERM CURRENT USE OF INSULIN: ICD-10-CM

## 2019-02-25 DIAGNOSIS — I10 ESSENTIAL HYPERTENSION: ICD-10-CM

## 2019-02-25 DIAGNOSIS — E66.01 MORBID OBESITY (HCC): ICD-10-CM

## 2019-02-25 DIAGNOSIS — E78.00 PURE HYPERCHOLESTEROLEMIA: ICD-10-CM

## 2019-02-25 DIAGNOSIS — E78.5 DYSLIPIDEMIA: ICD-10-CM

## 2019-02-25 RX ORDER — DAPAGLIFLOZIN 10 MG/1
TABLET, FILM COATED ORAL
Qty: 90 TABLET | Refills: 3 | Status: SHIPPED | OUTPATIENT
Start: 2019-02-25 | End: 2019-02-27 | Stop reason: SDUPTHER

## 2019-02-27 DIAGNOSIS — E66.01 MORBID OBESITY (HCC): ICD-10-CM

## 2019-02-27 DIAGNOSIS — IMO0001 UNCONTROLLED TYPE 2 DIABETES MELLITUS WITHOUT COMPLICATION, WITHOUT LONG-TERM CURRENT USE OF INSULIN: ICD-10-CM

## 2019-02-27 DIAGNOSIS — Z12.5 PROSTATE CANCER SCREENING: ICD-10-CM

## 2019-02-27 DIAGNOSIS — I10 ESSENTIAL HYPERTENSION: ICD-10-CM

## 2019-02-27 DIAGNOSIS — E55.9 VITAMIN D DEFICIENCY: ICD-10-CM

## 2019-02-27 DIAGNOSIS — E78.5 DYSLIPIDEMIA: ICD-10-CM

## 2019-02-27 DIAGNOSIS — E78.00 PURE HYPERCHOLESTEROLEMIA: ICD-10-CM

## 2019-02-27 RX ORDER — DAPAGLIFLOZIN 10 MG/1
TABLET, FILM COATED ORAL
Qty: 90 TABLET | Refills: 3 | Status: SHIPPED | OUTPATIENT
Start: 2019-02-27 | End: 2019-03-04 | Stop reason: SDUPTHER

## 2019-03-04 DIAGNOSIS — IMO0001 UNCONTROLLED TYPE 2 DIABETES MELLITUS WITHOUT COMPLICATION, WITHOUT LONG-TERM CURRENT USE OF INSULIN: ICD-10-CM

## 2019-03-04 DIAGNOSIS — Z12.5 PROSTATE CANCER SCREENING: ICD-10-CM

## 2019-03-04 DIAGNOSIS — E66.01 MORBID OBESITY (HCC): ICD-10-CM

## 2019-03-04 DIAGNOSIS — I10 ESSENTIAL HYPERTENSION: ICD-10-CM

## 2019-03-04 DIAGNOSIS — E55.9 VITAMIN D DEFICIENCY: ICD-10-CM

## 2019-03-04 DIAGNOSIS — E78.00 PURE HYPERCHOLESTEROLEMIA: ICD-10-CM

## 2019-03-04 DIAGNOSIS — E78.5 DYSLIPIDEMIA: ICD-10-CM

## 2019-03-04 RX ORDER — DAPAGLIFLOZIN 10 MG/1
TABLET, FILM COATED ORAL
Qty: 90 TABLET | Refills: 3 | Status: SHIPPED | OUTPATIENT
Start: 2019-03-04 | End: 2019-03-06 | Stop reason: SDUPTHER

## 2019-03-04 RX ORDER — EXENATIDE 2 MG/.85ML
2 INJECTION, SUSPENSION, EXTENDED RELEASE SUBCUTANEOUS WEEKLY
Qty: 12 PEN | Refills: 3 | Status: SHIPPED | OUTPATIENT
Start: 2019-03-04 | End: 2019-03-06 | Stop reason: SDUPTHER

## 2019-03-06 ENCOUNTER — OFFICE VISIT (OUTPATIENT)
Dept: ENDOCRINOLOGY | Age: 64
End: 2019-03-06

## 2019-03-06 VITALS
SYSTOLIC BLOOD PRESSURE: 136 MMHG | WEIGHT: 278 LBS | BODY MASS INDEX: 41.18 KG/M2 | DIASTOLIC BLOOD PRESSURE: 84 MMHG | HEIGHT: 69 IN

## 2019-03-06 DIAGNOSIS — E78.00 PURE HYPERCHOLESTEROLEMIA: ICD-10-CM

## 2019-03-06 DIAGNOSIS — I10 ESSENTIAL HYPERTENSION: ICD-10-CM

## 2019-03-06 DIAGNOSIS — E55.9 VITAMIN D DEFICIENCY: ICD-10-CM

## 2019-03-06 DIAGNOSIS — E66.01 MORBID OBESITY (HCC): ICD-10-CM

## 2019-03-06 DIAGNOSIS — IMO0001 UNCONTROLLED TYPE 2 DIABETES MELLITUS WITHOUT COMPLICATION, WITHOUT LONG-TERM CURRENT USE OF INSULIN: ICD-10-CM

## 2019-03-06 DIAGNOSIS — E78.5 DYSLIPIDEMIA: ICD-10-CM

## 2019-03-06 PROCEDURE — 99214 OFFICE O/P EST MOD 30 MIN: CPT | Performed by: NURSE PRACTITIONER

## 2019-03-06 RX ORDER — ERGOCALCIFEROL 1.25 MG/1
50000 CAPSULE ORAL 2 TIMES WEEKLY
Qty: 26 CAPSULE | Refills: 3 | Status: SHIPPED | OUTPATIENT
Start: 2019-03-07 | End: 2019-06-20 | Stop reason: SDUPTHER

## 2019-03-06 RX ORDER — DAPAGLIFLOZIN 10 MG/1
TABLET, FILM COATED ORAL
Qty: 90 TABLET | Refills: 3 | Status: SHIPPED | OUTPATIENT
Start: 2019-03-06 | End: 2019-03-15 | Stop reason: SDUPTHER

## 2019-03-06 RX ORDER — EXENATIDE 2 MG/.85ML
2 INJECTION, SUSPENSION, EXTENDED RELEASE SUBCUTANEOUS WEEKLY
Qty: 12 PEN | Refills: 3 | Status: SHIPPED | OUTPATIENT
Start: 2019-03-06 | End: 2019-03-15 | Stop reason: SDUPTHER

## 2019-03-06 RX ORDER — ICOSAPENT ETHYL 1000 MG/1
2 CAPSULE ORAL 2 TIMES DAILY WITH MEALS
Qty: 360 CAPSULE | Refills: 3 | Status: SHIPPED | OUTPATIENT
Start: 2019-03-06 | End: 2019-06-20 | Stop reason: SDUPTHER

## 2019-03-06 RX ORDER — SITAGLIPTIN AND METFORMIN HYDROCHLORIDE 1000; 50 MG/1; MG/1
2 TABLET, FILM COATED, EXTENDED RELEASE ORAL
Qty: 60 TABLET | Refills: 5 | Status: SHIPPED | OUTPATIENT
Start: 2019-03-06 | End: 2019-04-29 | Stop reason: SDUPTHER

## 2019-03-06 NOTE — PROGRESS NOTES
Dell Thorpe  presents to the office  for the follow-up appointment for Type 2 diabetes mellitus.     The diabete's condition location is throughout the system with the  clinical course has fluctuated, the severity is Mild, and the modifying/allievating factors are oral medications.  Medications and  Dosages were  reviewed with Dell Thorpe and suggested that compliance most of the time.    The patient reports associated symptoms of hyperglycemia have been none and associated symptoms of hypoglycemia have been none, with their  hypoglycemia threshold for symptoms is n/a mg/dl .     The patient is currently on oral medications .      Compliance with blood glucose monitoring: good.     Meal panning: The patient is using avoidance of concentrated sweets.    The patient is currently taking home blood tests - Blood glucose testin times daily, that are:    Last instructions given were:  In summary I saw and examined this 63-year-old gentleman for above-mentioned problems.  I reviewed his laboratory evaluation of 10/19/2018 and provided him with a hard copy of it.  Overall he is clinically and metabolically stable and therefore we will go ahead and continue all his current prescriptions.  I also encouraged him to make sure now that he has dropped his hemoglobin A1c as well as his weight and the swelling on both ankles are all gone the best thing he can do is to quit smoking and he did not seem to be making any commitment in that area.  He also told me that he has been out of alprazolam for about 10 days and since nothing has changed and he is feeling okay I convinced him not to continue it anymore and he agreed with it.He will see Ms. Mirlande Carrillo in 4 months or sooner if needed with laboratory evaluation prior to each office visit.    Home blood glucose testing daily: 0    Last reported blood glucose readings are: None.    Patterns reported per patient are none.         The following portions of the patient's history  were reviewed and updated as appropriate: current medications, past family history, past medical history, past social history, past surgical history and problem list.      Current Outpatient Medications:   •  albuterol (PROVENTIL HFA;VENTOLIN HFA) 108 (90 BASE) MCG/ACT inhaler, Proventil  (90 Base) MCG/ACT Inhalation Aerosol Solution; Patient Sig: Proventil  (90 Base) MCG/ACT Inhalation Aerosol Solution INHALE 1 TO 2 PUFFS EVERY 4 TO 6 HOURS AS NEEDED.; 1; 5; 04-Dec-2015; Active, Disp: , Rfl:   •  ALPRAZolam (XANAX) 0.25 MG tablet, TAKE 1 TABLET BY MOUTH ONCE DAILY AT BEDTIME AS NEEDED FOR ANXIETY, Disp: 30 tablet, Rfl: 0  •  BYDUREON BCISE 2 MG/0.85ML auto-injector injection, Inject 0.85 mL under the skin into the appropriate area as directed 1 (One) Time Per Week., Disp: 12 pen, Rfl: 3  •  [START ON 3/7/2019] ergocalciferol (DRISDOL) 24414 units capsule, Take 1 capsule by mouth 2 (Two) Times a Week., Disp: 26 capsule, Rfl: 3  •  FARXIGA 10 MG tablet, 1 tablet every morning, Disp: 90 tablet, Rfl: 3  •  hydrochlorothiazide (HYDRODIURIL) 25 MG tablet, Take 1 tablet by mouth Daily., Disp: 90 tablet, Rfl: 1  •  JANUMET XR  MG tablet, Take 2 tablets by mouth Daily With Dinner., Disp: 60 tablet, Rfl: 5  •  lisinopril (PRINIVIL,ZESTRIL) 20 MG tablet, TAKE 1 TABLET BY MOUTH ONCE DAILY, Disp: 90 tablet, Rfl: 1  •  ONETOUCH DELICA LANCETS FINE misc, Test 3 times daily DX: e11.65, Disp: 100 each, Rfl: 5  •  ONETOUCH VERIO test strip, Test 3 times daily DX: e11.65, Disp: 100 each, Rfl: 5  •  rosuvastatin (CRESTOR) 20 MG tablet, Take 1 tablet by mouth Every Night., Disp: 30 tablet, Rfl: 11  •  SABRA-24 200 MG 24 hr capsule, Take 1 capsule by mouth Daily., Disp: 30 capsule, Rfl: 1  •  VASCEPA 1 g capsule capsule, Take 2 g by mouth 2 (Two) Times a Day With Meals., Disp: 360 capsule, Rfl: 3    Patient Active Problem List    Diagnosis   • Morbid obesity (CMS/HCC) [E66.01]   • Vitamin D deficiency [E55.9]   •  "Dyslipidemia [E78.5]   • Tobacco abuse counseling [Z71.6]   • Prostate cancer screening [Z12.5]   • Poor urinary stream  [R39.12]   • Hypoxia [R09.02]   • Anxiety [F41.9]   • Chronic obstructive pulmonary disease (CMS/HCC) [J44.9]   • Essential hypertension [I10]   • Hyperlipidemia [E78.5]   • Uncontrolled type 2 diabetes mellitus (CMS/HCC) [E11.65]       Review of Systems   A comprehensive review of the 14 systems was negative except of listed below:  Endocrine: hyperglycemia  ETOH - 1/2 quart every 3 days and drinking regular cokes     Objective:     Wt Readings from Last 3 Encounters:   03/06/19 126 kg (278 lb)   11/02/18 128 kg (283 lb)   06/28/18 (!) 139 kg (306 lb)     Temp Readings from Last 3 Encounters:   02/23/18 98 °F (36.7 °C) (Oral)   10/18/17 98.2 °F (36.8 °C) (Oral)   04/10/17 98.4 °F (36.9 °C) (Oral)     BP Readings from Last 3 Encounters:   03/06/19 136/84   11/02/18 146/94   06/28/18 128/84     Pulse Readings from Last 3 Encounters:   02/23/18 90   10/18/17 80   04/10/17 99        /84   Ht 175.3 cm (69.02\")   Wt 126 kg (278 lb)   BMI 41.03 kg/m²        Physical Exam   Constitutional: He is oriented to person, place, and time. He appears well-developed and well-nourished. No distress.   HENT:   Head: Normocephalic and atraumatic.   Eyes: EOM are normal. Pupils are equal, round, and reactive to light.   Neck: Normal range of motion. Neck supple. No thyromegaly present.   Cardiovascular: Normal rate, regular rhythm, normal heart sounds and intact distal pulses.   No murmur heard.  Pulmonary/Chest: Effort normal. He has wheezes.   Abdominal: Soft. Bowel sounds are normal.   Musculoskeletal: Normal range of motion.   Neurological: He is alert and oriented to person, place, and time.   Skin: Skin is warm and dry. Capillary refill takes 2 to 3 seconds. He is not diaphoretic.   Psychiatric: He has a normal mood and affect. His behavior is normal. Judgment and thought content normal.   Nursing note " and vitals reviewed.          Lab Review  Results for orders placed or performed in visit on 02/18/19   T4 & TSH ([facility])   Result Value Ref Range    TSH 3.760 0.450 - 4.500 uIU/mL    T4, Total 6.5 4.5 - 12.0 ug/dL   Uric Acid   Result Value Ref Range    Uric Acid 4.0 3.7 - 8.6 mg/dL   Vitamin D 25 Hydroxy   Result Value Ref Range    25 Hydroxy, Vitamin D 49.8 30.0 - 100.0 ng/mL   T3, Free   Result Value Ref Range    T3, Free 3.7 2.0 - 4.4 pg/mL   T4, Free   Result Value Ref Range    Free T4 1.39 0.82 - 1.77 ng/dL   Comprehensive Metabolic Panel   Result Value Ref Range    Glucose 143 (H) 65 - 99 mg/dL    BUN 13 8 - 27 mg/dL    Creatinine 0.65 (L) 0.76 - 1.27 mg/dL    eGFR Non African Am 104 >59 mL/min/1.73    eGFR African Am 120 >59 mL/min/1.73    BUN/Creatinine Ratio 20 10 - 24    Sodium 141 134 - 144 mmol/L    Potassium 3.8 3.5 - 5.2 mmol/L    Chloride 99 96 - 106 mmol/L    Total CO2 25 20 - 29 mmol/L    Calcium 9.7 8.6 - 10.2 mg/dL    Total Protein 6.8 6.0 - 8.5 g/dL    Albumin 4.5 3.6 - 4.8 g/dL    Globulin 2.3 1.5 - 4.5 g/dL    A/G Ratio 2.0 1.2 - 2.2    Total Bilirubin 0.7 0.0 - 1.2 mg/dL    Alkaline Phosphatase 55 39 - 117 IU/L    AST (SGOT) 29 0 - 40 IU/L    ALT (SGPT) 30 0 - 44 IU/L   C-Peptide   Result Value Ref Range    C-Peptide 3.3 1.1 - 4.4 ng/mL   Hemoglobin A1c   Result Value Ref Range    Hemoglobin A1C 6.6 (H) 4.8 - 5.6 %   Lipid Panel   Result Value Ref Range    Total Cholesterol 137 100 - 199 mg/dL    Triglycerides 247 (H) 0 - 149 mg/dL    HDL Cholesterol 64 >39 mg/dL    VLDL Cholesterol 49 (H) 5 - 40 mg/dL    LDL Cholesterol  24 0 - 99 mg/dL   MicroAlbumin, Urine, Random - Urine, Clean Catch   Result Value Ref Range    Microalbumin, Urine 72.5 Not Estab. ug/mL   Cardiovascular Risk Assessment   Result Value Ref Range    Interpretation Note    Diabetes Patient Education   Result Value Ref Range    PDF Image Not applicable            Assessment:   Dell was seen today for follow-up.    Diagnoses  and all orders for this visit:    Dyslipidemia  -     VASCEPA 1 g capsule capsule; Take 2 g by mouth 2 (Two) Times a Day With Meals.  -     JANUMET XR  MG tablet; Take 2 tablets by mouth Daily With Dinner.  -     FARXIGA 10 MG tablet; 1 tablet every morning  -     BYDUREON BCISE 2 MG/0.85ML auto-injector injection; Inject 0.85 mL under the skin into the appropriate area as directed 1 (One) Time Per Week.  -     Comprehensive Metabolic Panel; Future    Uncontrolled type 2 diabetes mellitus without complication, without long-term current use of insulin (CMS/HCC)  -     JANUMET XR  MG tablet; Take 2 tablets by mouth Daily With Dinner.  -     FARXIGA 10 MG tablet; 1 tablet every morning  -     BYDUREON BCISE 2 MG/0.85ML auto-injector injection; Inject 0.85 mL under the skin into the appropriate area as directed 1 (One) Time Per Week.  -     Fructosamine; Future  -     Cortisol; Future  -     Comprehensive Metabolic Panel; Future  -     C-Peptide; Future  -     Hemoglobin A1c; Future  -     Lipid Panel; Future  -     Microalbumin / Creatinine Urine Ratio - Urine, Clean Catch; Future  -     Uric Acid; Future  -     Vitamin D 25 Hydroxy; Future  -     TSH; Future  -     Thyroid Antibodies; Future  -     T4, Free; Future  -     T3, Free; Future    Essential hypertension  -     JANUMET XR  MG tablet; Take 2 tablets by mouth Daily With Dinner.  -     FARXIGA 10 MG tablet; 1 tablet every morning  -     BYDUREON BCISE 2 MG/0.85ML auto-injector injection; Inject 0.85 mL under the skin into the appropriate area as directed 1 (One) Time Per Week.  -     Comprehensive Metabolic Panel; Future    Pure hypercholesterolemia  -     JANUMET XR  MG tablet; Take 2 tablets by mouth Daily With Dinner.  -     FARXIGA 10 MG tablet; 1 tablet every morning  -     BYDUREON BCISE 2 MG/0.85ML auto-injector injection; Inject 0.85 mL under the skin into the appropriate area as directed 1 (One) Time Per Week.  -      Comprehensive Metabolic Panel; Future  -     Lipid Panel; Future    Morbid obesity (CMS/HCC)  -     JANUMET XR  MG tablet; Take 2 tablets by mouth Daily With Dinner.  -     FARXIGA 10 MG tablet; 1 tablet every morning  -     BYDUREON BCISE 2 MG/0.85ML auto-injector injection; Inject 0.85 mL under the skin into the appropriate area as directed 1 (One) Time Per Week.  -     Comprehensive Metabolic Panel; Future    Vitamin D deficiency  -     JANUMET XR  MG tablet; Take 2 tablets by mouth Daily With Dinner.  -     FARXIGA 10 MG tablet; 1 tablet every morning  -     BYDUREON BCISE 2 MG/0.85ML auto-injector injection; Inject 0.85 mL under the skin into the appropriate area as directed 1 (One) Time Per Week.  -     ergocalciferol (DRISDOL) 41916 units capsule; Take 1 capsule by mouth 2 (Two) Times a Week.  -     Comprehensive Metabolic Panel; Future  -     Vitamin D 25 Hydroxy; Future          Plan:   In summary/ Medication changes: I met with this patient is metabolically stable and doing well at this time.  Laboratory testing was reviewed dated 2019, discussed with questions and answers completed.  Discussed and formulate a treatment plan with patient, patient verbally stated understood all instructions.         Dyslipidemia -chronic, uncontrolled.  Medication changes were as follows- re-start vascepa 1g - twice daily.-      Uncontrolled type 2 diabetes mellitus without complication, without long-term current use of insulin- chronic, stable no medication changes at this time. Refills prescribed.       Essential hypertension- chronic, stable no medication changes at this time. Refills prescribed. Future labs ordered for upcoming appointment and assessment.       Vitamin D deficiency- chronic, stable no medication changes at this time. Refills prescribed. Future labs ordered for upcoming appointment and assessment.        Additional instructions:  home blood tests -  Blood glucose testin times  daily, that are:  fasting- 1st thing in morning before eating or drinking  before each meal and 1 or 2 hours after meal  bedtime  anytime you feel symptoms of hyperglycemia or hypoglycemia (high or low blood sugars)        Education:  interpretation of lab results, blood sugar goals, complications of diabetes mellitus, hypoglycemia prevention and treatment, exercise, illness management, self-monitoring of blood glucose skills, nutrition and carbohydrate counting        The total face to face time spent was  25 minutes  with additional education given: 14 minutes (greater than 50% of the total time) was spent with counseling and coordination of care on: SMBG with goals, Side effects profiles with medications, medication use and purposes,     Return in about 3 months (around 6/6/2019), or if symptoms worsen or fail to improve, for Recheck. 3 months with Mirlande-2 weeks prior for labs 6 months with Dr. Garcia-2 weeks prior for labs      Dragon transcription disclaimer     Much of this encounter note is an electronic transcription/translation of spoken language to printed text. The electronic translation of spoken language may permit erroneous, or at times, nonsensical words or phrases to be inadvertently transcribed. Although I have reviewed the note for such errors, some may still exist.

## 2019-03-15 ENCOUNTER — TELEPHONE (OUTPATIENT)
Dept: ENDOCRINOLOGY | Age: 64
End: 2019-03-15

## 2019-03-15 DIAGNOSIS — E78.5 DYSLIPIDEMIA: ICD-10-CM

## 2019-03-15 DIAGNOSIS — E66.01 MORBID OBESITY (HCC): ICD-10-CM

## 2019-03-15 DIAGNOSIS — E78.00 PURE HYPERCHOLESTEROLEMIA: ICD-10-CM

## 2019-03-15 DIAGNOSIS — IMO0001 UNCONTROLLED TYPE 2 DIABETES MELLITUS WITHOUT COMPLICATION, WITHOUT LONG-TERM CURRENT USE OF INSULIN: ICD-10-CM

## 2019-03-15 DIAGNOSIS — E55.9 VITAMIN D DEFICIENCY: ICD-10-CM

## 2019-03-15 DIAGNOSIS — I10 ESSENTIAL HYPERTENSION: ICD-10-CM

## 2019-03-15 RX ORDER — EXENATIDE 2 MG/.85ML
2 INJECTION, SUSPENSION, EXTENDED RELEASE SUBCUTANEOUS WEEKLY
Qty: 12 PEN | Refills: 3 | Status: SHIPPED | OUTPATIENT
Start: 2019-03-15 | End: 2019-04-17 | Stop reason: SDUPTHER

## 2019-03-15 RX ORDER — DAPAGLIFLOZIN 10 MG/1
TABLET, FILM COATED ORAL
Qty: 90 TABLET | Refills: 3 | Status: SHIPPED | OUTPATIENT
Start: 2019-03-15 | End: 2019-04-17 | Stop reason: SDUPTHER

## 2019-03-15 NOTE — TELEPHONE ENCOUNTER
----- Message from Rita Carmen sent at 3/12/2019  3:13 PM EDT -----  Contact: [employer]  Patient is trying to get assistance through the manufactures for     BYDUREON BCISE 2 MG/0.85ML  FARXIGA 10 MG tablet  [employer], stated they must have a hard copy with signature, that the manufactures will not accept e scripts.     Fax # [phone] [facility]

## 2019-04-17 DIAGNOSIS — E78.00 PURE HYPERCHOLESTEROLEMIA: ICD-10-CM

## 2019-04-17 DIAGNOSIS — E55.9 VITAMIN D DEFICIENCY: ICD-10-CM

## 2019-04-17 DIAGNOSIS — IMO0001 UNCONTROLLED TYPE 2 DIABETES MELLITUS WITHOUT COMPLICATION, WITHOUT LONG-TERM CURRENT USE OF INSULIN: ICD-10-CM

## 2019-04-17 DIAGNOSIS — I10 ESSENTIAL HYPERTENSION: ICD-10-CM

## 2019-04-17 DIAGNOSIS — E66.01 MORBID OBESITY (HCC): ICD-10-CM

## 2019-04-17 DIAGNOSIS — E78.5 DYSLIPIDEMIA: ICD-10-CM

## 2019-04-17 RX ORDER — EXENATIDE 2 MG/.85ML
2 INJECTION, SUSPENSION, EXTENDED RELEASE SUBCUTANEOUS WEEKLY
Qty: 12 PEN | Refills: 3 | Status: SHIPPED | OUTPATIENT
Start: 2019-04-17 | End: 2019-06-20 | Stop reason: SDUPTHER

## 2019-04-17 RX ORDER — DAPAGLIFLOZIN 10 MG/1
TABLET, FILM COATED ORAL
Qty: 90 TABLET | Refills: 3 | Status: SHIPPED | OUTPATIENT
Start: 2019-04-17 | End: 2019-06-20 | Stop reason: SDUPTHER

## 2019-04-26 ENCOUNTER — TELEPHONE (OUTPATIENT)
Dept: ENDOCRINOLOGY | Age: 64
End: 2019-04-26

## 2019-04-26 NOTE — TELEPHONE ENCOUNTER
----- Message from Lubna Cruz MA sent at 4/26/2019 11:33 AM EDT -----  Patient is wanting a return call to discuss medication renewal for his diabetic supplies  Saw Mirlande hart but wants Dr Garcia to fill out the paperwork.

## 2019-04-29 DIAGNOSIS — E78.5 DYSLIPIDEMIA: ICD-10-CM

## 2019-04-29 DIAGNOSIS — IMO0001 UNCONTROLLED TYPE 2 DIABETES MELLITUS WITHOUT COMPLICATION, WITHOUT LONG-TERM CURRENT USE OF INSULIN: ICD-10-CM

## 2019-04-29 DIAGNOSIS — E66.01 MORBID OBESITY (HCC): ICD-10-CM

## 2019-04-29 DIAGNOSIS — E55.9 VITAMIN D DEFICIENCY: ICD-10-CM

## 2019-04-29 DIAGNOSIS — E78.00 PURE HYPERCHOLESTEROLEMIA: ICD-10-CM

## 2019-04-29 DIAGNOSIS — I10 ESSENTIAL HYPERTENSION: ICD-10-CM

## 2019-04-29 RX ORDER — SITAGLIPTIN AND METFORMIN HYDROCHLORIDE 1000; 50 MG/1; MG/1
2 TABLET, FILM COATED, EXTENDED RELEASE ORAL
Qty: 180 TABLET | Refills: 3 | Status: SHIPPED | OUTPATIENT
Start: 2019-04-29 | End: 2019-06-20 | Stop reason: SDUPTHER

## 2019-06-06 ENCOUNTER — RESULTS ENCOUNTER (OUTPATIENT)
Dept: ENDOCRINOLOGY | Age: 64
End: 2019-06-06

## 2019-06-06 DIAGNOSIS — E55.9 VITAMIN D DEFICIENCY: ICD-10-CM

## 2019-06-06 DIAGNOSIS — IMO0001 UNCONTROLLED TYPE 2 DIABETES MELLITUS WITHOUT COMPLICATION, WITHOUT LONG-TERM CURRENT USE OF INSULIN: ICD-10-CM

## 2019-06-06 DIAGNOSIS — E78.00 PURE HYPERCHOLESTEROLEMIA: ICD-10-CM

## 2019-06-06 DIAGNOSIS — E66.01 MORBID OBESITY (HCC): ICD-10-CM

## 2019-06-06 DIAGNOSIS — E78.5 DYSLIPIDEMIA: ICD-10-CM

## 2019-06-06 DIAGNOSIS — I10 ESSENTIAL HYPERTENSION: ICD-10-CM

## 2019-06-07 LAB
25(OH)D3+25(OH)D2 SERPL-MCNC: 46.4 NG/ML (ref 30–100)
ALBUMIN SERPL-MCNC: 4.7 G/DL (ref 3.5–5.2)
ALBUMIN/CREAT UR: 226.4 MG/G CREAT (ref 0–30)
ALBUMIN/GLOB SERPL: 1.7 G/DL
ALP SERPL-CCNC: 56 U/L (ref 39–117)
ALT SERPL-CCNC: 27 U/L (ref 1–41)
AST SERPL-CCNC: 28 U/L (ref 1–40)
BILIRUB SERPL-MCNC: 0.4 MG/DL (ref 0.2–1.2)
BUN SERPL-MCNC: 10 MG/DL (ref 8–23)
BUN/CREAT SERPL: 13.7 (ref 7–25)
C PEPTIDE SERPL-MCNC: 4.5 NG/ML (ref 1.1–4.4)
CALCIUM SERPL-MCNC: 11.7 MG/DL (ref 8.6–10.5)
CHLORIDE SERPL-SCNC: 98 MMOL/L (ref 98–107)
CHOLEST SERPL-MCNC: 150 MG/DL (ref 0–200)
CO2 SERPL-SCNC: 27.8 MMOL/L (ref 22–29)
CORTIS SERPL-MCNC: 12 UG/DL
CREAT SERPL-MCNC: 0.73 MG/DL (ref 0.76–1.27)
CREAT UR-MCNC: 91.6 MG/DL
FRUCTOSAMINE SERPL-SCNC: 256 UMOL/L (ref 0–285)
GLOBULIN SER CALC-MCNC: 2.7 GM/DL
GLUCOSE SERPL-MCNC: 176 MG/DL (ref 65–99)
HBA1C MFR BLD: 6.8 % (ref 4.8–5.6)
HDLC SERPL-MCNC: 72 MG/DL (ref 40–60)
INTERPRETATION: NORMAL
LDLC SERPL CALC-MCNC: 9 MG/DL (ref 0–100)
Lab: NORMAL
MICROALBUMIN UR-MCNC: 207.4 UG/ML
POTASSIUM SERPL-SCNC: 4.5 MMOL/L (ref 3.5–5.2)
PROT SERPL-MCNC: 7.4 G/DL (ref 6–8.5)
SODIUM SERPL-SCNC: 142 MMOL/L (ref 136–145)
T3FREE SERPL-MCNC: 3 PG/ML (ref 2–4.4)
T4 FREE SERPL-MCNC: 1.23 NG/DL (ref 0.93–1.7)
THYROGLOB AB SERPL-ACNC: <1 IU/ML (ref 0–0.9)
THYROPEROXIDASE AB SERPL-ACNC: 20 IU/ML (ref 0–34)
TRIGL SERPL-MCNC: 347 MG/DL (ref 0–150)
TSH SERPL DL<=0.005 MIU/L-ACNC: 2.57 MIU/ML (ref 0.27–4.2)
URATE SERPL-MCNC: 4.1 MG/DL (ref 3.4–7)
VLDLC SERPL CALC-MCNC: 69.4 MG/DL

## 2019-06-14 NOTE — PROGRESS NOTES
Dell JEAN-BAPTISTE Thorpe  presents to the office  for the follow-up appointment for Type 2 diabetes mellitus.     The diabete's condition location is throughout the system with the  clinical course has fluctuated, the severity is Mild, and the modifying/allievating factors are oral medications.  Medications and  Dosages were  reviewed with Dell JERILYN Thorpe and suggested that noncompliance some of the time.    The patient reports associated symptoms of hyperglycemia have been none and associated symptoms of hypoglycemia have been none, with their  hypoglycemia threshold for symptoms is n/a mg/dl .     The patient is currently on oral medications .      Compliance with blood glucose monitoring: poor.     Meal panning: The patient is using no plan.    The patient is currently taking home blood tests - Blood glucose testin times daily, that are:    Last instructions given were:  Dyslipidemia -chronic, uncontrolled.  Medication changes were as follows- re-start vascepa 1g - twice daily.-                  Uncontrolled type 2 diabetes mellitus without complication, without long-term current use of insulin- chronic, stable no medication changes at this time. Refills prescribed.       Essential hypertension- chronic, stable no medication changes at this time. Refills prescribed. Future labs ordered for upcoming appointment and assessment.       Vitamin D deficiency- chronic, stable no medication changes at this time. Refills prescribed. Future labs ordered for upcoming appointment and assessment    Home blood glucose testing daily: 0    Last reported blood glucose readings are: None.    Patterns reported per patient are none .         The following portions of the patient's history were reviewed and updated as appropriate: current medications, past family history, past medical history, past social history, past surgical history and problem list.      Current Outpatient Medications:   •  albuterol (PROVENTIL HFA;VENTOLIN HFA) 108 (90 BASE)  MCG/ACT inhaler, Proventil  (90 Base) MCG/ACT Inhalation Aerosol Solution; Patient Sig: Proventil  (90 Base) MCG/ACT Inhalation Aerosol Solution INHALE 1 TO 2 PUFFS EVERY 4 TO 6 HOURS AS NEEDED.; 1; 5; 04-Dec-2015; Active, Disp: , Rfl:   •  ALPRAZolam (XANAX) 0.25 MG tablet, TAKE 1 TABLET BY MOUTH ONCE DAILY AT BEDTIME AS NEEDED FOR ANXIETY, Disp: 30 tablet, Rfl: 0  •  BYDUREON BCISE 2 MG/0.85ML auto-injector injection, Inject 0.85 mL under the skin into the appropriate area as directed 1 (One) Time Per Week., Disp: 12 pen, Rfl: 1  •  ergocalciferol (DRISDOL) 83929 units capsule, Take 1 capsule by mouth 2 (Two) Times a Week., Disp: 26 capsule, Rfl: 3  •  FARXIGA 10 MG tablet, 1 tablet every morning, Disp: 90 tablet, Rfl: 1  •  hydrochlorothiazide (HYDRODIURIL) 25 MG tablet, Take 1 tablet by mouth Daily., Disp: 90 tablet, Rfl: 1  •  JANUMET XR  MG tablet, Take 2 tablets by mouth Daily With Dinner., Disp: 180 tablet, Rfl: 1  •  lisinopril (PRINIVIL,ZESTRIL) 20 MG tablet, TAKE 1 TABLET BY MOUTH ONCE DAILY, Disp: 90 tablet, Rfl: 1  •  ONETOUCH DELICA LANCETS FINE misc, Test 3 times daily DX: e11.65, Disp: 100 each, Rfl: 5  •  ONETOUCH VERIO test strip, Test 3 times daily DX: e11.65, Disp: 100 each, Rfl: 5  •  rosuvastatin (CRESTOR) 20 MG tablet, Take 1 tablet by mouth Every Night., Disp: 90 tablet, Rfl: 1  •  SABRA-24 200 MG 24 hr capsule, Take 1 capsule by mouth Daily., Disp: 30 capsule, Rfl: 1  •  VASCEPA 1 g capsule capsule, Take 2 g by mouth 2 (Two) Times a Day With Meals., Disp: 360 capsule, Rfl: 1    Patient Active Problem List    Diagnosis   • Morbid obesity (CMS/HCC) [E66.01]   • Vitamin D deficiency [E55.9]   • Dyslipidemia [E78.5]   • Tobacco abuse counseling [Z71.6]   • Prostate cancer screening [Z12.5]   • Poor urinary stream  [R39.12]   • Hypoxia [R09.02]   • Anxiety [F41.9]   • Chronic obstructive pulmonary disease (CMS/HCC) [J44.9]   • Essential hypertension [I10]   • Hyperlipidemia  "[E78.5]   • Uncontrolled type 2 diabetes mellitus (CMS/HCC) [E11.65]       Review of Systems   A comprehensive review of systems was negative.- 14 systems reviewd     Objective:     Wt Readings from Last 3 Encounters:   06/20/19 122 kg (270 lb)   03/06/19 126 kg (278 lb)   11/02/18 128 kg (283 lb)     Temp Readings from Last 3 Encounters:   02/23/18 98 °F (36.7 °C) (Oral)   10/18/17 98.2 °F (36.8 °C) (Oral)   04/10/17 98.4 °F (36.9 °C) (Oral)     BP Readings from Last 3 Encounters:   06/20/19 134/78   03/06/19 136/84   11/02/18 146/94     Pulse Readings from Last 3 Encounters:   02/23/18 90   10/18/17 80   04/10/17 99        /78   Ht 175.3 cm (69\")   Wt 122 kg (270 lb)   BMI 39.87 kg/m²        Physical Exam   Constitutional: He is oriented to person, place, and time. He appears well-developed and well-nourished. No distress.   HENT:   Head: Normocephalic and atraumatic.   Eyes: EOM are normal. Pupils are equal, round, and reactive to light.   Neck: Normal range of motion. Neck supple. No thyromegaly present.   Cardiovascular: Normal rate, regular rhythm, normal heart sounds and intact distal pulses.   No murmur heard.  Pulmonary/Chest: Effort normal and breath sounds normal.   Abdominal: Soft. Bowel sounds are normal.   Musculoskeletal: Normal range of motion.   Neurological: He is alert and oriented to person, place, and time.   Skin: Skin is warm and dry. Capillary refill takes 2 to 3 seconds. He is not diaphoretic.   Psychiatric: He has a normal mood and affect. His behavior is normal. Judgment and thought content normal.   Nursing note and vitals reviewed.          Lab Review  Results for orders placed or performed in visit on 06/06/19   Fructosamine   Result Value Ref Range    Fructosamine 256 0 - 285 umol/L   Cortisol   Result Value Ref Range    Cortisol 12.0 ug/dL   Comprehensive Metabolic Panel   Result Value Ref Range    Glucose 176 (H) 65 - 99 mg/dL    BUN 10 8 - 23 mg/dL    Creatinine 0.73 (L) " 0.76 - 1.27 mg/dL    eGFR Non African Am 109 >60 mL/min/1.73    eGFR African Am 132 >60 mL/min/1.73    BUN/Creatinine Ratio 13.7 7.0 - 25.0    Sodium 142 136 - 145 mmol/L    Potassium 4.5 3.5 - 5.2 mmol/L    Chloride 98 98 - 107 mmol/L    Total CO2 27.8 22.0 - 29.0 mmol/L    Calcium 11.7 (H) 8.6 - 10.5 mg/dL    Total Protein 7.4 6.0 - 8.5 g/dL    Albumin 4.70 3.50 - 5.20 g/dL    Globulin 2.7 gm/dL    A/G Ratio 1.7 g/dL    Total Bilirubin 0.4 0.2 - 1.2 mg/dL    Alkaline Phosphatase 56 39 - 117 U/L    AST (SGOT) 28 1 - 40 U/L    ALT (SGPT) 27 1 - 41 U/L   C-Peptide   Result Value Ref Range    C-Peptide 4.5 (H) 1.1 - 4.4 ng/mL   Hemoglobin A1c   Result Value Ref Range    Hemoglobin A1C 6.80 (H) 4.80 - 5.60 %   Lipid Panel   Result Value Ref Range    Total Cholesterol 150 0 - 200 mg/dL    Triglycerides 347 (H) 0 - 150 mg/dL    HDL Cholesterol 72 (H) 40 - 60 mg/dL    VLDL Cholesterol 69.4 mg/dL    LDL Cholesterol  9 0 - 100 mg/dL   Microalbumin / Creatinine Urine Ratio - Urine, Clean Catch   Result Value Ref Range    Creatinine, Urine 91.6 Not Estab. mg/dL    Microalbumin, Urine 207.4 Not Estab. ug/mL    Microalbumin/Creatinine Ratio 226.4 (H) 0.0 - 30.0 mg/g creat   Uric Acid   Result Value Ref Range    Uric Acid 4.1 3.4 - 7.0 mg/dL   Vitamin D 25 Hydroxy   Result Value Ref Range    25 Hydroxy, Vitamin D 46.4 30.0 - 100.0 ng/ml   TSH   Result Value Ref Range    TSH 2.570 0.270 - 4.200 mIU/mL   Thyroid Antibodies   Result Value Ref Range    Thyroid Peroxidase Antibody 20 0 - 34 IU/mL    Thyroglobulin Ab <1.0 0.0 - 0.9 IU/mL   T4, Free   Result Value Ref Range    Free T4 1.23 0.93 - 1.70 ng/dL   T3, Free   Result Value Ref Range    T3, Free 3.0 2.0 - 4.4 pg/mL   Cardiovascular Risk Assessment   Result Value Ref Range    Interpretation Note    Diabetes Patient Education   Result Value Ref Range    PDF Image Not applicable            Assessment:   Dell was seen today for follow-up.    Diagnoses and all orders for this  visit:    Uncontrolled type 2 diabetes mellitus without complication, without long-term current use of insulin (CMS/[facility])  -     JANUMET XR  MG tablet; Take 2 tablets by mouth Daily With Dinner.  -     FARXIGA 10 MG tablet; 1 tablet every morning  -     BYDUREON BCISE 2 MG/0.85ML auto-injector injection; Inject 0.85 mL under the skin into the appropriate area as directed 1 (One) Time Per Week.  -     Comprehensive Metabolic Panel; Future  -     C-Peptide; Future  -     Hemoglobin A1c; Future  -     Insulin, Total; Future  -     Lipid Panel; Future  -     Microalbumin / Creatinine Urine Ratio - Urine, Clean Catch; Future  -     Uric Acid; Future  -     Vitamin D 25 Hydroxy; Future  -     TSH; Future  -     Thyroid Antibodies; Future  -     T4, Free; Future  -     T3, Free; Future    Dyslipidemia  -     JANUMET XR  MG tablet; Take 2 tablets by mouth Daily With Dinner.  -     VASCEPA 1 g capsule capsule; Take 2 g by mouth 2 (Two) Times a Day With Meals.  -     FARXIGA 10 MG tablet; 1 tablet every morning  -     BYDUREON BCISE 2 MG/0.85ML auto-injector injection; Inject 0.85 mL under the skin into the appropriate area as directed 1 (One) Time Per Week.  -     rosuvastatin (CRESTOR) 20 MG tablet; Take 1 tablet by mouth Every Night.  -     Comprehensive Metabolic Panel; Future  -     Lipid Panel; Future    Essential hypertension  -     JANUMET XR  MG tablet; Take 2 tablets by mouth Daily With Dinner.  -     FARXIGA 10 MG tablet; 1 tablet every morning  -     BYDUREON BCISE 2 MG/0.85ML auto-injector injection; Inject 0.85 mL under the skin into the appropriate area as directed 1 (One) Time Per Week.  -     Comprehensive Metabolic Panel; Future    Vitamin D deficiency  -     JANUMET XR  MG tablet; Take 2 tablets by mouth Daily With Dinner.  -     FARXIGA 10 MG tablet; 1 tablet every morning  -     BYDUREON BCISE 2 MG/0.85ML auto-injector injection; Inject 0.85 mL under the skin into the  appropriate area as directed 1 (One) Time Per Week.  -     ergocalciferol (DRISDOL) 82351 units capsule; Take 1 capsule by mouth 2 (Two) Times a Week.  -     Comprehensive Metabolic Panel; Future  -     Vitamin D 25 Hydroxy; Future    Morbid obesity (CMS/HCC)  -     JANUMET XR  MG tablet; Take 2 tablets by mouth Daily With Dinner.  -     FARXIGA 10 MG tablet; 1 tablet every morning  -     BYDUREON BCISE 2 MG/0.85ML auto-injector injection; Inject 0.85 mL under the skin into the appropriate area as directed 1 (One) Time Per Week.  -     Comprehensive Metabolic Panel; Future    Pure hypercholesterolemia  -     JANUMET XR  MG tablet; Take 2 tablets by mouth Daily With Dinner.  -     FARXIGA 10 MG tablet; 1 tablet every morning  -     BYDUREON BCISE 2 MG/0.85ML auto-injector injection; Inject 0.85 mL under the skin into the appropriate area as directed 1 (One) Time Per Week.  -     Comprehensive Metabolic Panel; Future          Plan:   In summary/ Medication changes: I met with this patient is metabolically stable and doing well at this time.  Laboratory testing was reviewed dated 6.6.2019, discussed with questions and answers completed.  Discussed and formulate a treatment plan with patient, patient verbally stated understood all instructions.    1. Diagnoses and all orders for this visit:    Uncontrolled type 2 diabetes mellitus without complication, without long-term current use of insulin (CMS/HCC)- chronic, stable no medication changes at this time. Refills prescribed. Future labs ordered for upcoming appointment and assessment.      Dyslipidemia- chronic, stable no medication changes at this time. Refills prescribed. Future labs ordered for upcoming appointment and assessment.        Essential hypertension- chronic, stable no medication changes at this time. Refills prescribed. Future labs ordered for upcoming appointment and assessment.        Vitamin D deficiency- chronic, stable no medication  changes at this time. Refills prescribed. Future labs ordered for upcoming appointment and assessment.         Pure hypercholesterolemia- chronic, stable no medication changes at this time. Refills prescribed. Future labs ordered for upcoming appointment and assessment.        Instructions:    home blood tests -  Blood glucose testing: 3 times daily, that are:  fasting- 1st thing in morning before eating or drinking  before each meal and 1 or 2 hours after meal  bedtime  anytime you feel symptoms of hyperglycemia or hypoglycemia (high or low blood sugars)        Education:  interpretation of lab results, blood sugar goals, complications of diabetes mellitus, hypoglycemia prevention and treatment, exercise, illness management, self-monitoring of blood glucose skills, nutrition, carbohydrate counting and site rotation        The total face to face time spent was  25 minutes  with additional education given: 14 minutes (greater than 50% of the total time) was spent with counseling and coordination of care on: SMBG with goals, Side effects profiles with medications, medication use and purposes,     Return if symptoms worsen or fail to improve, for Recheck. Keep f/u appt with Dr. Garcia - 2 weeks prior for labs    Dragon transcription disclaimer     Much of this encounter note is an electronic transcription/translation of spoken language to printed text. The electronic translation of spoken language may permit erroneous, or at times, nonsensical words or phrases to be inadvertently transcribed. Although I have reviewed the note for such errors, some may still exist.

## 2019-06-20 ENCOUNTER — OFFICE VISIT (OUTPATIENT)
Dept: ENDOCRINOLOGY | Age: 64
End: 2019-06-20

## 2019-06-20 VITALS
WEIGHT: 270 LBS | BODY MASS INDEX: 39.99 KG/M2 | SYSTOLIC BLOOD PRESSURE: 134 MMHG | HEIGHT: 69 IN | DIASTOLIC BLOOD PRESSURE: 78 MMHG

## 2019-06-20 DIAGNOSIS — E78.5 DYSLIPIDEMIA: ICD-10-CM

## 2019-06-20 DIAGNOSIS — E78.00 PURE HYPERCHOLESTEROLEMIA: ICD-10-CM

## 2019-06-20 DIAGNOSIS — IMO0001 UNCONTROLLED TYPE 2 DIABETES MELLITUS WITHOUT COMPLICATION, WITHOUT LONG-TERM CURRENT USE OF INSULIN: Primary | ICD-10-CM

## 2019-06-20 DIAGNOSIS — E55.9 VITAMIN D DEFICIENCY: ICD-10-CM

## 2019-06-20 DIAGNOSIS — E66.01 MORBID OBESITY (HCC): ICD-10-CM

## 2019-06-20 DIAGNOSIS — I10 ESSENTIAL HYPERTENSION: ICD-10-CM

## 2019-06-20 PROCEDURE — 99214 OFFICE O/P EST MOD 30 MIN: CPT | Performed by: NURSE PRACTITIONER

## 2019-06-20 RX ORDER — DAPAGLIFLOZIN 10 MG/1
TABLET, FILM COATED ORAL
Qty: 90 TABLET | Refills: 1 | Status: SHIPPED | OUTPATIENT
Start: 2019-06-20 | End: 2019-10-11 | Stop reason: SDUPTHER

## 2019-06-20 RX ORDER — EXENATIDE 2 MG/.85ML
2 INJECTION, SUSPENSION, EXTENDED RELEASE SUBCUTANEOUS WEEKLY
Qty: 12 PEN | Refills: 1 | Status: SHIPPED | OUTPATIENT
Start: 2019-06-20 | End: 2019-10-11 | Stop reason: SDUPTHER

## 2019-06-20 RX ORDER — ERGOCALCIFEROL 1.25 MG/1
50000 CAPSULE ORAL 2 TIMES WEEKLY
Qty: 26 CAPSULE | Refills: 3 | Status: SHIPPED | OUTPATIENT
Start: 2019-06-20 | End: 2019-10-11 | Stop reason: SDUPTHER

## 2019-06-20 RX ORDER — ICOSAPENT ETHYL 1000 MG/1
2 CAPSULE ORAL 2 TIMES DAILY WITH MEALS
Qty: 360 CAPSULE | Refills: 1 | Status: SHIPPED | OUTPATIENT
Start: 2019-06-20 | End: 2019-10-11 | Stop reason: SDUPTHER

## 2019-06-20 RX ORDER — SITAGLIPTIN AND METFORMIN HYDROCHLORIDE 1000; 50 MG/1; MG/1
2 TABLET, FILM COATED, EXTENDED RELEASE ORAL
Qty: 180 TABLET | Refills: 1 | Status: SHIPPED | OUTPATIENT
Start: 2019-06-20 | End: 2019-10-11 | Stop reason: SDUPTHER

## 2019-06-20 RX ORDER — ROSUVASTATIN CALCIUM 20 MG/1
20 TABLET, COATED ORAL NIGHTLY
Qty: 90 TABLET | Refills: 1 | Status: SHIPPED | OUTPATIENT
Start: 2019-06-20 | End: 2019-10-11 | Stop reason: SDUPTHER

## 2019-07-16 RX ORDER — LISINOPRIL 20 MG/1
TABLET ORAL
Qty: 90 TABLET | Refills: 1 | OUTPATIENT
Start: 2019-07-16

## 2019-07-24 RX ORDER — LISINOPRIL 20 MG/1
TABLET ORAL
Qty: 90 TABLET | Refills: 1 | Status: SHIPPED | OUTPATIENT
Start: 2019-07-24 | End: 2020-01-13

## 2019-09-16 DIAGNOSIS — E11.65 UNCONTROLLED TYPE 2 DIABETES MELLITUS WITH HYPERGLYCEMIA (HCC): ICD-10-CM

## 2019-09-16 DIAGNOSIS — E78.5 DYSLIPIDEMIA: ICD-10-CM

## 2019-09-16 DIAGNOSIS — E55.9 VITAMIN D DEFICIENCY: ICD-10-CM

## 2019-09-16 DIAGNOSIS — E78.2 MIXED HYPERLIPIDEMIA: Primary | ICD-10-CM

## 2019-09-20 ENCOUNTER — RESULTS ENCOUNTER (OUTPATIENT)
Dept: ENDOCRINOLOGY | Age: 64
End: 2019-09-20

## 2019-09-20 DIAGNOSIS — E78.5 DYSLIPIDEMIA: ICD-10-CM

## 2019-09-20 DIAGNOSIS — I10 ESSENTIAL HYPERTENSION: ICD-10-CM

## 2019-09-20 DIAGNOSIS — E78.00 PURE HYPERCHOLESTEROLEMIA: ICD-10-CM

## 2019-09-20 DIAGNOSIS — E55.9 VITAMIN D DEFICIENCY: ICD-10-CM

## 2019-09-20 DIAGNOSIS — IMO0001 UNCONTROLLED TYPE 2 DIABETES MELLITUS WITHOUT COMPLICATION, WITHOUT LONG-TERM CURRENT USE OF INSULIN: ICD-10-CM

## 2019-09-20 DIAGNOSIS — E66.01 MORBID OBESITY (HCC): ICD-10-CM

## 2019-10-01 ENCOUNTER — LAB (OUTPATIENT)
Dept: ENDOCRINOLOGY | Age: 64
End: 2019-10-01

## 2019-10-01 DIAGNOSIS — IMO0001 UNCONTROLLED TYPE 2 DIABETES MELLITUS WITHOUT COMPLICATION, WITHOUT LONG-TERM CURRENT USE OF INSULIN: ICD-10-CM

## 2019-10-01 DIAGNOSIS — E11.65 UNCONTROLLED TYPE 2 DIABETES MELLITUS WITH HYPERGLYCEMIA (HCC): ICD-10-CM

## 2019-10-01 DIAGNOSIS — I10 ESSENTIAL HYPERTENSION: ICD-10-CM

## 2019-10-01 DIAGNOSIS — E78.5 DYSLIPIDEMIA: ICD-10-CM

## 2019-10-01 DIAGNOSIS — E78.2 MIXED HYPERLIPIDEMIA: ICD-10-CM

## 2019-10-01 DIAGNOSIS — E78.00 PURE HYPERCHOLESTEROLEMIA: ICD-10-CM

## 2019-10-01 DIAGNOSIS — Z71.6 TOBACCO ABUSE COUNSELING: ICD-10-CM

## 2019-10-01 DIAGNOSIS — E66.01 MORBID OBESITY (HCC): ICD-10-CM

## 2019-10-01 DIAGNOSIS — Z12.5 PROSTATE CANCER SCREENING: ICD-10-CM

## 2019-10-01 DIAGNOSIS — E55.9 VITAMIN D DEFICIENCY: ICD-10-CM

## 2019-10-02 LAB
25(OH)D3+25(OH)D2 SERPL-MCNC: 47.8 NG/ML (ref 30–100)
ALBUMIN SERPL-MCNC: 4.3 G/DL (ref 3.5–5.2)
ALBUMIN/GLOB SERPL: 1.8 G/DL
ALP SERPL-CCNC: 54 U/L (ref 39–117)
ALT SERPL-CCNC: 32 U/L (ref 1–41)
AST SERPL-CCNC: 43 U/L (ref 1–40)
BILIRUB SERPL-MCNC: 0.4 MG/DL (ref 0.2–1.2)
BUN SERPL-MCNC: 5 MG/DL (ref 8–23)
BUN/CREAT SERPL: 6.4 (ref 7–25)
C PEPTIDE SERPL-MCNC: 4.9 NG/ML (ref 1.1–4.4)
CALCIUM SERPL-MCNC: 10.1 MG/DL (ref 8.6–10.5)
CHLORIDE SERPL-SCNC: 99 MMOL/L (ref 98–107)
CHOLEST SERPL-MCNC: 137 MG/DL (ref 0–200)
CO2 SERPL-SCNC: 32.5 MMOL/L (ref 22–29)
CREAT SERPL-MCNC: 0.78 MG/DL (ref 0.76–1.27)
GLOBULIN SER CALC-MCNC: 2.4 GM/DL
GLUCOSE SERPL-MCNC: 165 MG/DL (ref 65–99)
HBA1C MFR BLD: 6.8 % (ref 4.8–5.6)
HDLC SERPL-MCNC: 78 MG/DL (ref 40–60)
INTERPRETATION: NORMAL
LDLC SERPL CALC-MCNC: 28 MG/DL (ref 0–100)
Lab: NORMAL
MICROALBUMIN UR-MCNC: 142 UG/ML
POTASSIUM SERPL-SCNC: 3.9 MMOL/L (ref 3.5–5.2)
PROT SERPL-MCNC: 6.7 G/DL (ref 6–8.5)
SODIUM SERPL-SCNC: 147 MMOL/L (ref 136–145)
T4 SERPL-MCNC: 5.98 MCG/DL (ref 4.5–11.7)
TRIGL SERPL-MCNC: 156 MG/DL (ref 0–150)
TSH SERPL DL<=0.005 MIU/L-ACNC: 2 UIU/ML (ref 0.27–4.2)
URATE SERPL-MCNC: 4.2 MG/DL (ref 3.4–7)
VLDLC SERPL CALC-MCNC: 31.2 MG/DL

## 2019-10-11 ENCOUNTER — OFFICE VISIT (OUTPATIENT)
Dept: ENDOCRINOLOGY | Age: 64
End: 2019-10-11

## 2019-10-11 VITALS
BODY MASS INDEX: 40.02 KG/M2 | SYSTOLIC BLOOD PRESSURE: 132 MMHG | DIASTOLIC BLOOD PRESSURE: 84 MMHG | HEIGHT: 69 IN | WEIGHT: 270.2 LBS

## 2019-10-11 DIAGNOSIS — E78.2 MIXED HYPERLIPIDEMIA: ICD-10-CM

## 2019-10-11 DIAGNOSIS — E55.9 VITAMIN D DEFICIENCY: ICD-10-CM

## 2019-10-11 DIAGNOSIS — E78.5 DYSLIPIDEMIA: ICD-10-CM

## 2019-10-11 DIAGNOSIS — E78.00 PURE HYPERCHOLESTEROLEMIA: ICD-10-CM

## 2019-10-11 DIAGNOSIS — E11.65 UNCONTROLLED TYPE 2 DIABETES MELLITUS WITH HYPERGLYCEMIA (HCC): Primary | ICD-10-CM

## 2019-10-11 DIAGNOSIS — IMO0001 UNCONTROLLED TYPE 2 DIABETES MELLITUS WITHOUT COMPLICATION, WITHOUT LONG-TERM CURRENT USE OF INSULIN: ICD-10-CM

## 2019-10-11 DIAGNOSIS — I10 ESSENTIAL HYPERTENSION: ICD-10-CM

## 2019-10-11 DIAGNOSIS — E66.01 MORBID OBESITY (HCC): ICD-10-CM

## 2019-10-11 DIAGNOSIS — N52.9 ERECTILE DYSFUNCTION, UNSPECIFIED ERECTILE DYSFUNCTION TYPE: ICD-10-CM

## 2019-10-11 PROCEDURE — 99214 OFFICE O/P EST MOD 30 MIN: CPT | Performed by: INTERNAL MEDICINE

## 2019-10-11 RX ORDER — DAPAGLIFLOZIN 10 MG/1
TABLET, FILM COATED ORAL
Qty: 90 TABLET | Refills: 3 | Status: SHIPPED | OUTPATIENT
Start: 2019-10-11

## 2019-10-11 RX ORDER — BLOOD SUGAR DIAGNOSTIC
STRIP MISCELLANEOUS
Qty: 100 EACH | Refills: 5 | Status: SHIPPED | OUTPATIENT
Start: 2019-10-11

## 2019-10-11 RX ORDER — ROSUVASTATIN CALCIUM 20 MG/1
20 TABLET, COATED ORAL NIGHTLY
Qty: 90 TABLET | Refills: 3 | Status: SHIPPED | OUTPATIENT
Start: 2019-10-11 | End: 2020-10-10

## 2019-10-11 RX ORDER — EXENATIDE 2 MG/.85ML
2 INJECTION, SUSPENSION, EXTENDED RELEASE SUBCUTANEOUS WEEKLY
Qty: 12 PEN | Refills: 3 | Status: SHIPPED | OUTPATIENT
Start: 2019-10-11 | End: 2020-06-15 | Stop reason: SDUPTHER

## 2019-10-11 RX ORDER — ERGOCALCIFEROL 1.25 MG/1
50000 CAPSULE ORAL 2 TIMES WEEKLY
Qty: 26 CAPSULE | Refills: 3 | Status: SHIPPED | OUTPATIENT
Start: 2019-10-14 | End: 2020-10-13

## 2019-10-11 RX ORDER — SILDENAFIL 100 MG/1
100 TABLET, FILM COATED ORAL AS NEEDED
Qty: 20 TABLET | Refills: 3 | Status: SHIPPED | OUTPATIENT
Start: 2019-10-11 | End: 2020-10-10

## 2019-10-11 RX ORDER — SITAGLIPTIN AND METFORMIN HYDROCHLORIDE 1000; 50 MG/1; MG/1
2 TABLET, FILM COATED, EXTENDED RELEASE ORAL
Qty: 180 TABLET | Refills: 3 | Status: SHIPPED | OUTPATIENT
Start: 2019-10-11 | End: 2020-05-14 | Stop reason: SDUPTHER

## 2019-10-11 RX ORDER — ICOSAPENT ETHYL 1000 MG/1
2 CAPSULE ORAL 2 TIMES DAILY WITH MEALS
Qty: 360 CAPSULE | Refills: 3 | Status: SHIPPED | OUTPATIENT
Start: 2019-10-11

## 2019-10-11 NOTE — PROGRESS NOTES
"Subjective   Dell Thorpe is a 64 y.o. male seen for follow up for Dm2, hyperlipidemia, lab review. Patient is checking BG 1xdaily.  Patient denies any problems or concerns.     /84   Ht 175.3 cm (69\")   Wt 123 kg (270 lb 3.2 oz)   BMI 39.90 kg/m²     Allergies   Allergen Reactions   • Penicillins          Current Outpatient Medications:   •  albuterol (PROVENTIL HFA;VENTOLIN HFA) 108 (90 BASE) MCG/ACT inhaler, Proventil  (90 Base) MCG/ACT Inhalation Aerosol Solution; Patient Sig: Proventil  (90 Base) MCG/ACT Inhalation Aerosol Solution INHALE 1 TO 2 PUFFS EVERY 4 TO 6 HOURS AS NEEDED.; 1; 5; 04-Dec-2015; Active, Disp: , Rfl:   •  ALPRAZolam (XANAX) 0.25 MG tablet, TAKE 1 TABLET BY MOUTH ONCE DAILY AT BEDTIME AS NEEDED FOR ANXIETY, Disp: 30 tablet, Rfl: 0  •  BYDUREON BCISE 2 MG/0.85ML auto-injector injection, Inject 0.85 mL under the skin into the appropriate area as directed 1 (One) Time Per Week., Disp: 12 pen, Rfl: 1  •  ergocalciferol (DRISDOL) 07655 units capsule, Take 1 capsule by mouth 2 (Two) Times a Week., Disp: 26 capsule, Rfl: 3  •  FARXIGA 10 MG tablet, 1 tablet every morning, Disp: 90 tablet, Rfl: 1  •  hydrochlorothiazide (HYDRODIURIL) 25 MG tablet, Take 1 tablet by mouth Daily., Disp: 90 tablet, Rfl: 1  •  JANUMET XR  MG tablet, Take 2 tablets by mouth Daily With Dinner., Disp: 180 tablet, Rfl: 1  •  lisinopril (PRINIVIL,ZESTRIL) 20 MG tablet, TAKE 1 TABLET BY MOUTH ONCE DAILY, Disp: 90 tablet, Rfl: 1  •  ONETOUCH DELICA LANCETS FINE misc, Test 3 times daily DX: e11.65, Disp: 100 each, Rfl: 5  •  ONETOUCH VERIO test strip, Test 3 times daily DX: e11.65, Disp: 100 each, Rfl: 5  •  rosuvastatin (CRESTOR) 20 MG tablet, Take 1 tablet by mouth Every Night., Disp: 90 tablet, Rfl: 1  •  SABRA-24 200 MG 24 hr capsule, Take 1 capsule by mouth Daily., Disp: 30 capsule, Rfl: 1  •  VASCEPA 1 g capsule capsule, Take 2 g by mouth 2 (Two) Times a Day With Meals., Disp: 360 capsule, Rfl: " 1     History of Present Illness this is a 70-year-old gentleman known patient with type 2 diabetes hypertension and dyslipidemia as well as morbid obesity and vitamin D deficiency.  Over the course of last 6 months he has had no significant health problem for which to go to the ER or hospital.    The following portions of the patient's history were reviewed and updated as appropriate: allergies, current medications, past family history, past medical history, past social history, past surgical history and problem list.    Review of Systems   Constitutional: Negative.    HENT: Negative.    Eyes: Negative.    Respiratory: Negative.    Cardiovascular: Negative.    Gastrointestinal: Negative.    Endocrine: Negative.    Genitourinary: Negative.    Musculoskeletal: Negative.    Skin: Negative.    Allergic/Immunologic: Negative.    Neurological: Negative.    Hematological: Negative.    Psychiatric/Behavioral: Negative.    Above review of system was reviewed, and corroborated and accepted.    Objective      Results for orders placed or performed in visit on 10/01/19   T4 & TSH ([facility])   Result Value Ref Range    TSH 2.000 0.270 - 4.200 uIU/mL    T4, Total 5.98 4.50 - 11.70 mcg/dL   Vitamin D 25 Hydroxy   Result Value Ref Range    25 Hydroxy, Vitamin D 47.8 30.0 - 100.0 ng/ml   Uric Acid   Result Value Ref Range    Uric Acid 4.2 3.4 - 7.0 mg/dL   MicroAlbumin, Urine, Random - Urine, Clean Catch   Result Value Ref Range    Microalbumin, Urine 142.0 Not Estab. ug/mL   Lipid Panel   Result Value Ref Range    Total Cholesterol 137 0 - 200 mg/dL    Triglycerides 156 (H) 0 - 150 mg/dL    HDL Cholesterol 78 (H) 40 - 60 mg/dL    VLDL Cholesterol 31.2 mg/dL    LDL Cholesterol  28 0 - 100 mg/dL   Hemoglobin A1c   Result Value Ref Range    Hemoglobin A1C 6.80 (H) 4.80 - 5.60 %   C-Peptide   Result Value Ref Range    C-Peptide 4.9 (H) 1.1 - 4.4 ng/mL   Comprehensive Metabolic Panel   Result Value Ref Range    Glucose 165 (H) 65 - 99  mg/dL    BUN 5 (L) 8 - 23 mg/dL    Creatinine 0.78 0.76 - 1.27 mg/dL    eGFR Non African Am 101 >60 mL/min/1.73    eGFR African Am 122 >60 mL/min/1.73    BUN/Creatinine Ratio 6.4 (L) 7.0 - 25.0    Sodium 147 (H) 136 - 145 mmol/L    Potassium 3.9 3.5 - 5.2 mmol/L    Chloride 99 98 - 107 mmol/L    Total CO2 32.5 (H) 22.0 - 29.0 mmol/L    Calcium 10.1 8.6 - 10.5 mg/dL    Total Protein 6.7 6.0 - 8.5 g/dL    Albumin 4.30 3.50 - 5.20 g/dL    Globulin 2.4 gm/dL    A/G Ratio 1.8 g/dL    Total Bilirubin 0.4 0.2 - 1.2 mg/dL    Alkaline Phosphatase 54 39 - 117 U/L    AST (SGOT) 43 (H) 1 - 40 U/L    ALT (SGPT) 32 1 - 41 U/L   Cardiovascular Risk Assessment   Result Value Ref Range    Interpretation Note    Diabetes Patient Education   Result Value Ref Range    PDF Image Not applicable        Physical Exam   Constitutional: He is oriented to person, place, and time. He appears well-developed and well-nourished. No distress.   Morbidly obese and almost constantly coughing.   HENT:   Head: Normocephalic and atraumatic.   Right Ear: External ear normal.   Left Ear: External ear normal.   Nose: Nose normal.   Mouth/Throat: Oropharynx is clear and moist. No oropharyngeal exudate.   Eyes: Conjunctivae and EOM are normal. Pupils are equal, round, and reactive to light. Right eye exhibits no discharge. Left eye exhibits no discharge. No scleral icterus.   Neck: Normal range of motion. Neck supple. No JVD present. No tracheal deviation present. No thyromegaly present.   Cardiovascular: Normal rate, regular rhythm, normal heart sounds, intact distal pulses and normal pulses. Exam reveals no gallop and no friction rub.   No murmur heard.  Pulmonary/Chest: Effort normal. No stridor. No respiratory distress. He has wheezes. He has no rales. He exhibits no tenderness.   Both inspiratory and expiratory wheezing   Abdominal: Soft. Bowel sounds are normal. He exhibits no distension and no mass. There is no tenderness. There is no rebound and no  guarding. No hernia.   Massive  abdomen   Musculoskeletal: Normal range of motion. He exhibits no edema, tenderness or deformity.   Lymphadenopathy:     He has no cervical adenopathy.   Neurological: He is alert and oriented to person, place, and time. He has normal reflexes. He displays normal reflexes. No cranial nerve deficit or sensory deficit. He exhibits normal muscle tone. Coordination normal.   Skin: Skin is warm and dry. No rash noted. He is not diaphoretic. No erythema. No pallor.   Reddish face and eyes and the rosacea on both cheeks.  Skin tags and acanthosis nigricans around his neck indicating significant degree of insulin resistance   Psychiatric: He has a normal mood and affect. His behavior is normal. Judgment and thought content normal.   Nursing note and vitals reviewed.  No change since 11/2/2018 office visit.      Assessment/Plan   Diagnoses and all orders for this visit:    Uncontrolled type 2 diabetes mellitus with hyperglycemia (CMS/[facility])  -     T4 & TSH ([facility]); Future  -     Uric Acid; Future  -     Vitamin D 25 Hydroxy; Future  -     Comprehensive Metabolic Panel; Future  -     C-Peptide; Future  -     Hemoglobin A1c; Future  -     MicroAlbumin, Urine, Random - Urine, Clean Catch; Future  -     NMR LipoProfile; Future    Essential hypertension  -     JANUMET XR  MG tablet; Take 2 tablets by mouth Daily With Dinner.  -     FARXIGA 10 MG tablet; 1 tablet every morning  -     BYDUREON BCISE 2 MG/0.85ML auto-injector injection; Inject 0.85 mL under the skin into the appropriate area as directed 1 (One) Time Per Week.  -     T4 & TSH ([facility]); Future  -     Uric Acid; Future  -     Vitamin D 25 Hydroxy; Future  -     Comprehensive Metabolic Panel; Future  -     C-Peptide; Future  -     Hemoglobin A1c; Future  -     MicroAlbumin, Urine, Random - Urine, Clean Catch; Future  -     NMR LipoProfile; Future    Mixed hyperlipidemia  -     T4 & TSH ([facility]); Future  -     Uric Acid; Future  -      Vitamin D 25 Hydroxy; Future  -     Comprehensive Metabolic Panel; Future  -     C-Peptide; Future  -     Hemoglobin A1c; Future  -     MicroAlbumin, Urine, Random - Urine, Clean Catch; Future  -     NMR LipoProfile; Future    Morbid obesity (CMS/HCC)  -     JANUMET XR  MG tablet; Take 2 tablets by mouth Daily With Dinner.  -     FARXIGA 10 MG tablet; 1 tablet every morning  -     BYDUREON BCISE 2 MG/0.85ML auto-injector injection; Inject 0.85 mL under the skin into the appropriate area as directed 1 (One) Time Per Week.  -     T4 & TSH ([facility]); Future  -     Uric Acid; Future  -     Vitamin D 25 Hydroxy; Future  -     Comprehensive Metabolic Panel; Future  -     C-Peptide; Future  -     Hemoglobin A1c; Future  -     MicroAlbumin, Urine, Random - Urine, Clean Catch; Future  -     NMR LipoProfile; Future    Vitamin D deficiency  -     JANUMET XR  MG tablet; Take 2 tablets by mouth Daily With Dinner.  -     FARXIGA 10 MG tablet; 1 tablet every morning  -     ergocalciferol (DRISDOL) 73557 units capsule; Take 1 capsule by mouth 2 (Two) Times a Week.  -     BYDUREON BCISE 2 MG/0.85ML auto-injector injection; Inject 0.85 mL under the skin into the appropriate area as directed 1 (One) Time Per Week.  -     T4 & TSH ([facility]); Future  -     Uric Acid; Future  -     Vitamin D 25 Hydroxy; Future  -     Comprehensive Metabolic Panel; Future  -     C-Peptide; Future  -     Hemoglobin A1c; Future  -     MicroAlbumin, Urine, Random - Urine, Clean Catch; Future  -     NMR LipoProfile; Future    Dyslipidemia  -     VASCEPA 1 g capsule capsule; Take 2 g by mouth 2 (Two) Times a Day With Meals.  -     rosuvastatin (CRESTOR) 20 MG tablet; Take 1 tablet by mouth Every Night.  -     JANUMET XR  MG tablet; Take 2 tablets by mouth Daily With Dinner.  -     FARXIGA 10 MG tablet; 1 tablet every morning  -     BYDUREON BCISE 2 MG/0.85ML auto-injector injection; Inject 0.85 mL under the skin into the appropriate  area as directed 1 (One) Time Per Week.  -     T4 & TSH ([facility]); Future  -     Uric Acid; Future  -     Vitamin D 25 Hydroxy; Future  -     Comprehensive Metabolic Panel; Future  -     C-Peptide; Future  -     Hemoglobin A1c; Future  -     MicroAlbumin, Urine, Random - Urine, Clean Catch; Future  -     NMR LipoProfile; Future    Uncontrolled type 2 diabetes mellitus without complication, without long-term current use of insulin (CMS/[facility])  -     JANUMET XR  MG tablet; Take 2 tablets by mouth Daily With Dinner.  -     FARXIGA 10 MG tablet; 1 tablet every morning  -     BYDUREON BCISE 2 MG/0.85ML auto-injector injection; Inject 0.85 mL under the skin into the appropriate area as directed 1 (One) Time Per Week.  -     T4 & TSH ([facility]); Future  -     Uric Acid; Future  -     Vitamin D 25 Hydroxy; Future  -     Comprehensive Metabolic Panel; Future  -     C-Peptide; Future  -     Hemoglobin A1c; Future  -     MicroAlbumin, Urine, Random - Urine, Clean Catch; Future  -     NMR LipoProfile; Future    Pure hypercholesterolemia  -     JANUMET XR  MG tablet; Take 2 tablets by mouth Daily With Dinner.  -     FARXIGA 10 MG tablet; 1 tablet every morning  -     BYDUREON BCISE 2 MG/0.85ML auto-injector injection; Inject 0.85 mL under the skin into the appropriate area as directed 1 (One) Time Per Week.  -     T4 & TSH ([facility]); Future  -     Uric Acid; Future  -     Vitamin D 25 Hydroxy; Future  -     Comprehensive Metabolic Panel; Future  -     C-Peptide; Future  -     Hemoglobin A1c; Future  -     MicroAlbumin, Urine, Random - Urine, Clean Catch; Future  -     NMR LipoProfile; Future    Erectile dysfunction, unspecified erectile dysfunction type    Other orders  -     ONETOUCH VERIO test strip; Test 3 times daily DX: e11.65  -     ONETOUCH DELICA LANCETS FINE misc; Test 3 times daily DX: e11.65  -     sildenafil (VIAGRA) 100 MG tablet; Take 1 tablet by mouth As Needed for erectile dysfunction.      Is summary  I saw and examined 64-year-old gentleman for above-mentioned problems.  I reviewed his laboratory evaluations of 10/1/2019 and provided him with a hard copy of it.  Overall he is clinically and metabolically stable and therefore we will go ahead and continue his current prescriptions.  I am also prescribing Viagra 100 mg as needed for planned intercourse.  He will see Ms. Mirlande Lorena in 6 months or sooner if needed with laboratory evaluation prior to each office visit.

## 2020-01-13 RX ORDER — LISINOPRIL 20 MG/1
TABLET ORAL
Qty: 90 TABLET | Refills: 0 | Status: SHIPPED | OUTPATIENT
Start: 2020-01-13 | End: 2020-06-09 | Stop reason: SDUPTHER

## 2020-02-18 ENCOUNTER — TELEPHONE (OUTPATIENT)
Dept: ENDOCRINOLOGY | Age: 65
End: 2020-02-18

## 2020-02-18 NOTE — TELEPHONE ENCOUNTER
Patient states that he needs a script for the bydureon     [facility]   They have sent over a request for this last week per patient     90 day supply      Patient is out can he get samples while he is waiting  Please call patient at [phone]

## 2020-03-31 ENCOUNTER — RESULTS ENCOUNTER (OUTPATIENT)
Dept: ENDOCRINOLOGY | Age: 65
End: 2020-03-31

## 2020-03-31 DIAGNOSIS — I10 ESSENTIAL HYPERTENSION: ICD-10-CM

## 2020-03-31 DIAGNOSIS — E55.9 VITAMIN D DEFICIENCY: ICD-10-CM

## 2020-03-31 DIAGNOSIS — E11.65 UNCONTROLLED TYPE 2 DIABETES MELLITUS WITH HYPERGLYCEMIA (HCC): ICD-10-CM

## 2020-03-31 DIAGNOSIS — E66.01 MORBID OBESITY (HCC): ICD-10-CM

## 2020-03-31 DIAGNOSIS — E78.00 PURE HYPERCHOLESTEROLEMIA: ICD-10-CM

## 2020-03-31 DIAGNOSIS — IMO0001 UNCONTROLLED TYPE 2 DIABETES MELLITUS WITHOUT COMPLICATION, WITHOUT LONG-TERM CURRENT USE OF INSULIN: ICD-10-CM

## 2020-03-31 DIAGNOSIS — E78.5 DYSLIPIDEMIA: ICD-10-CM

## 2020-03-31 DIAGNOSIS — E78.2 MIXED HYPERLIPIDEMIA: ICD-10-CM

## 2020-05-14 DIAGNOSIS — E78.5 DYSLIPIDEMIA: ICD-10-CM

## 2020-05-14 DIAGNOSIS — E78.00 PURE HYPERCHOLESTEROLEMIA: ICD-10-CM

## 2020-05-14 DIAGNOSIS — E55.9 VITAMIN D DEFICIENCY: ICD-10-CM

## 2020-05-14 DIAGNOSIS — IMO0001 UNCONTROLLED TYPE 2 DIABETES MELLITUS WITHOUT COMPLICATION, WITHOUT LONG-TERM CURRENT USE OF INSULIN: ICD-10-CM

## 2020-05-14 DIAGNOSIS — I10 ESSENTIAL HYPERTENSION: ICD-10-CM

## 2020-05-14 DIAGNOSIS — E66.01 MORBID OBESITY (HCC): ICD-10-CM

## 2020-05-14 RX ORDER — SITAGLIPTIN AND METFORMIN HYDROCHLORIDE 1000; 50 MG/1; MG/1
2 TABLET, FILM COATED, EXTENDED RELEASE ORAL
Qty: 180 TABLET | Refills: 3 | Status: SHIPPED | OUTPATIENT
Start: 2020-05-14

## 2020-06-02 RX ORDER — LISINOPRIL 20 MG/1
TABLET ORAL
Qty: 90 TABLET | Refills: 0 | OUTPATIENT
Start: 2020-06-02

## 2020-06-03 RX ORDER — LISINOPRIL 20 MG/1
TABLET ORAL
Qty: 90 TABLET | Refills: 0 | OUTPATIENT
Start: 2020-06-03

## 2020-06-08 ENCOUNTER — TELEPHONE (OUTPATIENT)
Dept: ENDOCRINOLOGY | Age: 65
End: 2020-06-08

## 2020-06-08 NOTE — TELEPHONE ENCOUNTER
Pt called and is needing a refill of Lisinopril sent to the [facility] in [place]. I told pt that a different provider not in our office has been refilling that but he states that our office has filled it in the past.

## 2020-06-09 RX ORDER — LISINOPRIL 20 MG/1
20 TABLET ORAL DAILY
Qty: 90 TABLET | Refills: 3 | Status: SHIPPED | OUTPATIENT
Start: 2020-06-09

## 2020-06-09 NOTE — TELEPHONE ENCOUNTER
This medication has not been filled by our office. Ok to fill or should patient call ordering provider?

## 2020-06-15 DIAGNOSIS — E78.5 DYSLIPIDEMIA: ICD-10-CM

## 2020-06-15 DIAGNOSIS — E78.00 PURE HYPERCHOLESTEROLEMIA: ICD-10-CM

## 2020-06-15 DIAGNOSIS — I10 ESSENTIAL HYPERTENSION: ICD-10-CM

## 2020-06-15 DIAGNOSIS — E55.9 VITAMIN D DEFICIENCY: ICD-10-CM

## 2020-06-15 DIAGNOSIS — E66.01 MORBID OBESITY (HCC): ICD-10-CM

## 2020-06-15 DIAGNOSIS — E11.65 UNCONTROLLED TYPE 2 DIABETES MELLITUS WITH HYPERGLYCEMIA (HCC): ICD-10-CM

## 2020-06-15 RX ORDER — EXENATIDE 2 MG/.85ML
INJECTION, SUSPENSION, EXTENDED RELEASE SUBCUTANEOUS
Qty: 12 PEN | Refills: 3 | Status: SHIPPED | OUTPATIENT
Start: 2020-06-15

## 2020-06-19 ENCOUNTER — OFFICE VISIT CONVERTED (OUTPATIENT)
Dept: FAMILY MEDICINE CLINIC | Age: 65
End: 2020-06-19
Attending: NURSE PRACTITIONER

## 2020-06-19 ENCOUNTER — HOSPITAL ENCOUNTER (OUTPATIENT)
Dept: OTHER | Facility: HOSPITAL | Age: 65
Discharge: HOME | End: 2020-06-19
Attending: NURSE PRACTITIONER

## 2020-06-19 LAB
ALBUMIN SERPL-MCNC: 3.7 G/DL (ref 3.5–5)
ALBUMIN/GLOB SERPL: 1.2 {RATIO} (ref 1.4–2.6)
ALP SERPL-CCNC: 62 U/L (ref 56–155)
ALT SERPL-CCNC: 31 U/L (ref 10–40)
ANION GAP SERPL CALC-SCNC: 16 MMOL/L (ref 8–19)
AST SERPL-CCNC: 26 U/L (ref 15–50)
BASOPHILS # BLD MANUAL: 0.05 10*3/UL (ref 0–0.2)
BASOPHILS NFR BLD MANUAL: 0.8 % (ref 0–3)
BILIRUB SERPL-MCNC: 0.44 MG/DL (ref 0.2–1.3)
BUN SERPL-MCNC: 12 MG/DL (ref 5–25)
BUN/CREAT SERPL: 14 {RATIO} (ref 6–20)
CALCIUM SERPL-MCNC: 9.9 MG/DL (ref 8.7–10.4)
CHLORIDE SERPL-SCNC: 98 MMOL/L (ref 99–111)
CONV CO2: 29 MMOL/L (ref 22–32)
CONV TOTAL PROTEIN: 6.8 G/DL (ref 6.3–8.2)
CREAT UR-MCNC: 0.85 MG/DL (ref 0.7–1.2)
DEPRECATED RDW RBC AUTO: 58.5 FL
EOSINOPHIL # BLD MANUAL: 0.07 10*3/UL (ref 0–0.7)
EOSINOPHIL NFR BLD MANUAL: 1.2 % (ref 0–7)
ERYTHROCYTE [DISTWIDTH] IN BLOOD BY AUTOMATED COUNT: 14.7 % (ref 11.5–14.5)
EST. AVERAGE GLUCOSE BLD GHB EST-MCNC: 123 MG/DL
GFR SERPLBLD BASED ON 1.73 SQ M-ARVRAT: >60 ML/MIN/{1.73_M2}
GLOBULIN UR ELPH-MCNC: 3.1 G/DL (ref 2–3.5)
GLUCOSE SERPL-MCNC: 161 MG/DL (ref 70–99)
GRANS (ABSOLUTE): 3.68 10*3/UL (ref 2–8)
GRANS: 61.6 % (ref 30–85)
HBA1C MFR BLD: 16.4 G/DL (ref 14–18)
HBA1C MFR BLD: 5.9 % (ref 3.5–5.7)
HCT VFR BLD AUTO: 49.2 % (ref 42–52)
IMM GRANULOCYTES # BLD: 0.03 10*3/UL (ref 0–0.54)
IMM GRANULOCYTES NFR BLD: 0.5 % (ref 0–0.43)
INR PPP: 5.2 (ref 2–3)
LYMPHOCYTES # BLD MANUAL: 1.27 10*3/UL (ref 1–5)
LYMPHOCYTES NFR BLD MANUAL: 14.6 % (ref 3–10)
MCH RBC QN AUTO: 34.9 PG (ref 27–31)
MCHC RBC AUTO-ENTMCNC: 33.3 G/DL (ref 33–37)
MCV RBC AUTO: 104.7 FL (ref 80–96)
MONOCYTES # BLD AUTO: 0.87 10*3/UL (ref 0.2–1.2)
OSMOLALITY SERPL CALC.SUM OF ELEC: 291 MOSM/KG (ref 273–304)
PLATELET # BLD AUTO: 185 10*3/UL (ref 130–400)
PMV BLD AUTO: 9.3 FL (ref 7.4–10.4)
POTASSIUM SERPL-SCNC: 3.8 MMOL/L (ref 3.5–5.3)
PROTHROMBIN TIME: 50.5 S (ref 9.4–12)
RBC # BLD AUTO: 4.7 10*6/UL (ref 4.7–6.1)
SODIUM SERPL-SCNC: 139 MMOL/L (ref 135–147)
VARIANT LYMPHS NFR BLD MANUAL: 21.3 % (ref 20–45)
WBC # BLD AUTO: 5.97 10*3/UL (ref 4.8–10.8)

## 2020-06-23 ENCOUNTER — CONVERSION ENCOUNTER (OUTPATIENT)
Dept: FAMILY MEDICINE CLINIC | Age: 65
End: 2020-06-23

## 2020-06-24 ENCOUNTER — OFFICE VISIT CONVERTED (OUTPATIENT)
Dept: FAMILY MEDICINE CLINIC | Age: 65
End: 2020-06-24
Attending: FAMILY MEDICINE

## 2020-06-30 ENCOUNTER — CONVERSION ENCOUNTER (OUTPATIENT)
Dept: FAMILY MEDICINE CLINIC | Age: 65
End: 2020-06-30

## 2020-06-30 ENCOUNTER — HOSPITAL ENCOUNTER (OUTPATIENT)
Dept: OTHER | Facility: HOSPITAL | Age: 65
Discharge: HOME | End: 2020-06-30
Attending: FAMILY MEDICINE

## 2020-06-30 LAB
ALBUMIN SERPL-MCNC: 3.7 G/DL (ref 3.5–5)
ALBUMIN/GLOB SERPL: 1.1 {RATIO} (ref 1.4–2.6)
ALP SERPL-CCNC: 65 U/L (ref 56–155)
ALT SERPL-CCNC: 16 U/L (ref 10–40)
ANION GAP SERPL CALC-SCNC: 16 MMOL/L (ref 8–19)
AST SERPL-CCNC: 19 U/L (ref 15–50)
BASOPHILS # BLD MANUAL: 0.04 10*3/UL (ref 0–0.2)
BASOPHILS NFR BLD MANUAL: 0.5 % (ref 0–3)
BILIRUB SERPL-MCNC: 0.6 MG/DL (ref 0.2–1.3)
BUN SERPL-MCNC: 8 MG/DL (ref 5–25)
BUN/CREAT SERPL: 12 {RATIO} (ref 6–20)
CALCIUM SERPL-MCNC: 10 MG/DL (ref 8.7–10.4)
CHLORIDE SERPL-SCNC: 98 MMOL/L (ref 99–111)
CONV CO2: 28 MMOL/L (ref 22–32)
CONV TOTAL PROTEIN: 7.1 G/DL (ref 6.3–8.2)
CREAT UR-MCNC: 0.69 MG/DL (ref 0.7–1.2)
DEPRECATED RDW RBC AUTO: 58.2 FL
EOSINOPHIL # BLD MANUAL: 0.11 10*3/UL (ref 0–0.7)
EOSINOPHIL NFR BLD MANUAL: 1.5 % (ref 0–7)
ERYTHROCYTE [DISTWIDTH] IN BLOOD BY AUTOMATED COUNT: 15 % (ref 11.5–14.5)
GFR SERPLBLD BASED ON 1.73 SQ M-ARVRAT: >60 ML/MIN/{1.73_M2}
GLOBULIN UR ELPH-MCNC: 3.4 G/DL (ref 2–3.5)
GLUCOSE SERPL-MCNC: 134 MG/DL (ref 70–99)
GRANS (ABSOLUTE): 4.53 10*3/UL (ref 2–8)
GRANS: 61 % (ref 30–85)
HBA1C MFR BLD: 16.9 G/DL (ref 14–18)
HCT VFR BLD AUTO: 50.2 % (ref 42–52)
IMM GRANULOCYTES # BLD: 0.05 10*3/UL (ref 0–0.54)
IMM GRANULOCYTES NFR BLD: 0.7 % (ref 0–0.43)
LYMPHOCYTES # BLD MANUAL: 1.98 10*3/UL (ref 1–5)
LYMPHOCYTES NFR BLD MANUAL: 9.7 % (ref 3–10)
MCH RBC QN AUTO: 35.1 PG (ref 27–31)
MCHC RBC AUTO-ENTMCNC: 33.7 G/DL (ref 33–37)
MCV RBC AUTO: 104.4 FL (ref 80–96)
MONOCYTES # BLD AUTO: 0.72 10*3/UL (ref 0.2–1.2)
OSMOLALITY SERPL CALC.SUM OF ELEC: 286 MOSM/KG (ref 273–304)
PLATELET # BLD AUTO: 180 10*3/UL (ref 130–400)
PMV BLD AUTO: 8.7 FL (ref 7.4–10.4)
POTASSIUM SERPL-SCNC: 4.4 MMOL/L (ref 3.5–5.3)
RBC # BLD AUTO: 4.81 10*6/UL (ref 4.7–6.1)
SODIUM SERPL-SCNC: 138 MMOL/L (ref 135–147)
TSH SERPL-ACNC: 3.38 M[IU]/L (ref 0.27–4.2)
VARIANT LYMPHS NFR BLD MANUAL: 26.6 % (ref 20–45)
WBC # BLD AUTO: 7.43 10*3/UL (ref 4.8–10.8)

## 2020-07-02 ENCOUNTER — OFFICE VISIT CONVERTED (OUTPATIENT)
Dept: FAMILY MEDICINE CLINIC | Age: 65
End: 2020-07-02
Attending: FAMILY MEDICINE

## 2020-07-15 ENCOUNTER — CONVERSION ENCOUNTER (OUTPATIENT)
Dept: FAMILY MEDICINE CLINIC | Age: 65
End: 2020-07-15

## 2020-08-03 ENCOUNTER — CONVERSION ENCOUNTER (OUTPATIENT)
Dept: FAMILY MEDICINE CLINIC | Age: 65
End: 2020-08-03

## 2020-08-05 ENCOUNTER — OFFICE VISIT CONVERTED (OUTPATIENT)
Dept: FAMILY MEDICINE CLINIC | Age: 65
End: 2020-08-05
Attending: FAMILY MEDICINE

## 2020-09-01 ENCOUNTER — CONVERSION ENCOUNTER (OUTPATIENT)
Dept: FAMILY MEDICINE CLINIC | Age: 65
End: 2020-09-01

## 2020-09-14 ENCOUNTER — OFFICE VISIT CONVERTED (OUTPATIENT)
Dept: FAMILY MEDICINE CLINIC | Age: 65
End: 2020-09-14
Attending: FAMILY MEDICINE

## 2021-01-27 ENCOUNTER — CONVERSION ENCOUNTER (OUTPATIENT)
Dept: FAMILY MEDICINE CLINIC | Age: 66
End: 2021-01-27

## 2021-02-22 ENCOUNTER — CONVERSION ENCOUNTER (OUTPATIENT)
Dept: FAMILY MEDICINE CLINIC | Age: 66
End: 2021-02-22

## 2021-02-25 ENCOUNTER — OFFICE VISIT CONVERTED (OUTPATIENT)
Dept: FAMILY MEDICINE CLINIC | Age: 66
End: 2021-02-25
Attending: FAMILY MEDICINE

## 2021-03-01 ENCOUNTER — HOSPITAL ENCOUNTER (OUTPATIENT)
Dept: OTHER | Facility: HOSPITAL | Age: 66
Discharge: HOME | End: 2021-03-01
Attending: FAMILY MEDICINE

## 2021-03-01 LAB
ALBUMIN SERPL-MCNC: 3.9 G/DL (ref 3.5–5)
ALBUMIN/GLOB SERPL: 1.2 {RATIO} (ref 1.4–2.6)
ALP SERPL-CCNC: 82 U/L (ref 56–155)
ALT SERPL-CCNC: 17 U/L (ref 10–40)
ANION GAP SERPL CALC-SCNC: 20 MMOL/L (ref 8–19)
AST SERPL-CCNC: 25 U/L (ref 15–50)
BASOPHILS # BLD MANUAL: 0.02 10*3/UL (ref 0–0.2)
BASOPHILS NFR BLD MANUAL: 0.3 % (ref 0–3)
BILIRUB SERPL-MCNC: 0.62 MG/DL (ref 0.2–1.3)
BUN SERPL-MCNC: 8 MG/DL (ref 5–25)
BUN/CREAT SERPL: 11 {RATIO} (ref 6–20)
CALCIUM SERPL-MCNC: 9.5 MG/DL (ref 8.7–10.4)
CHLORIDE SERPL-SCNC: 99 MMOL/L (ref 99–111)
CHOLEST SERPL-MCNC: 165 MG/DL (ref 107–200)
CHOLEST/HDLC SERPL: 2.2 {RATIO} (ref 3–6)
CONV CO2: 27 MMOL/L (ref 22–32)
CONV CREATININE URINE, RANDOM: 242.9 MG/DL (ref 10–300)
CONV MICROALBUM.,U,RANDOM: 217.8 MG/L (ref 0–20)
CONV TOTAL PROTEIN: 7.2 G/DL (ref 6.3–8.2)
CREAT UR-MCNC: 0.76 MG/DL (ref 0.7–1.2)
DEPRECATED RDW RBC AUTO: 50.5 FL
EOSINOPHIL # BLD MANUAL: 0.06 10*3/UL (ref 0–0.7)
EOSINOPHIL NFR BLD MANUAL: 1 % (ref 0–7)
ERYTHROCYTE [DISTWIDTH] IN BLOOD BY AUTOMATED COUNT: 13.3 % (ref 11.5–14.5)
EST. AVERAGE GLUCOSE BLD GHB EST-MCNC: 151 MG/DL
GFR SERPLBLD BASED ON 1.73 SQ M-ARVRAT: >60 ML/MIN/{1.73_M2}
GLOBULIN UR ELPH-MCNC: 3.3 G/DL (ref 2–3.5)
GLUCOSE SERPL-MCNC: 155 MG/DL (ref 70–99)
GRANS (ABSOLUTE): 4.06 10*3/UL (ref 2–8)
GRANS: 68.1 % (ref 30–85)
HBA1C MFR BLD: 15.9 G/DL (ref 14–18)
HBA1C MFR BLD: 6.9 % (ref 3.5–5.7)
HCT VFR BLD AUTO: 47.9 % (ref 42–52)
HDLC SERPL-MCNC: 74 MG/DL (ref 40–60)
IMM GRANULOCYTES # BLD: 0.03 10*3/UL (ref 0–0.54)
IMM GRANULOCYTES NFR BLD: 0.5 % (ref 0–0.43)
LDLC SERPL CALC-MCNC: 71 MG/DL (ref 70–100)
LYMPHOCYTES # BLD MANUAL: 1.16 10*3/UL (ref 1–5)
LYMPHOCYTES NFR BLD MANUAL: 10.7 % (ref 3–10)
MCH RBC QN AUTO: 33.3 PG (ref 27–31)
MCHC RBC AUTO-ENTMCNC: 33.2 G/DL (ref 33–37)
MCV RBC AUTO: 100.4 FL (ref 80–96)
MICROALBUMIN/CREAT UR: 89.7 MG/G{CRE} (ref 0–25)
MONOCYTES # BLD AUTO: 0.64 10*3/UL (ref 0.2–1.2)
OSMOLALITY SERPL CALC.SUM OF ELEC: 295 MOSM/KG (ref 273–304)
PLATELET # BLD AUTO: 126 10*3/UL (ref 130–400)
PMV BLD AUTO: 9.2 FL (ref 7.4–10.4)
POTASSIUM SERPL-SCNC: 3.5 MMOL/L (ref 3.5–5.3)
RBC # BLD AUTO: 4.77 10*6/UL (ref 4.7–6.1)
SODIUM SERPL-SCNC: 142 MMOL/L (ref 135–147)
TRIGL SERPL-MCNC: 99 MG/DL (ref 40–150)
TSH SERPL-ACNC: 3.64 M[IU]/L (ref 0.27–4.2)
VARIANT LYMPHS NFR BLD MANUAL: 19.4 % (ref 20–45)
VLDLC SERPL-MCNC: 20 MG/DL (ref 5–37)
WBC # BLD AUTO: 5.97 10*3/UL (ref 4.8–10.8)

## 2021-03-02 LAB — 25(OH)D3 SERPL-MCNC: 28.1 NG/ML (ref 30–100)

## 2021-03-04 ENCOUNTER — CONVERSION ENCOUNTER (OUTPATIENT)
Dept: FAMILY MEDICINE CLINIC | Age: 66
End: 2021-03-04

## 2021-03-31 ENCOUNTER — CONVERSION ENCOUNTER (OUTPATIENT)
Dept: FAMILY MEDICINE CLINIC | Age: 66
End: 2021-03-31

## 2021-05-18 NOTE — PROGRESS NOTES
Dell Thorpe  1955     Office/Outpatient Visit    Visit Date: Fri, Jun 19, 2020 11:30 am    Provider: Nury Singh N.P. (Assistant: Montserrat Peters RN)    Location: [facility]        Electronically signed by Nury Singh N.P. on  06/25/2020 05:21:05 PM                             Subjective:        CC: Mr. Thorpe is a 65 year old male.  He is here today following a transition of care from an inpatient hospital: [facility]. The patient was admitted on 6/11/20 and discharged on 6/15/20. The patient was admitted for DVT. Our office called the patient within 48 hours of discharge and scheduled the follow-up appointment. During the patient's hospital stay the patient was treated by Dr. Granados. Medications have been reviewed and reconciled with discharge summary..  PT IS TAKING LOVENOX        HPI: Tristan is here today to establish care and follow up from recent hospitalization. He reports that he had fallen and had ankle injury. When he started swelling and became painful, he went to the ER and was found to have DVT and then PE. He was started on anticoagulation. Currently on warfarin and doing Lovenox bridge. Lovenox 100 mg SQ BID started 6/16/2020. Last dose 6/20/2020 pm. Doing for 5 days. Reports still with some pain in his leg. Has PRN pain medications to take. Is doing much better.    He has history of diabetes. Seeing endocrinologist. He is on Farxiga 5 mg daily and Janumet. Dr Coronado is his endocrinologist. He reports being started on Vitamin D a couple of years ago by his endo. He is due for follow up with them in October. Not sure of any recent lab results and not sure why not taking cholesterol medication.     He is on Lisinopril 20 mg daily for his blood pressure which he reports typically runs around 135/95.          PHQ-9 Depression Screening: Completed form scanned and in chart; Total Score 1     ROS:     CONSTITUTIONAL:  Negative for chills and fever.      CARDIOVASCULAR:   "Negative for chest pain and palpitations.      RESPIRATORY:  Positive for dyspnea ( with mild exertion ).   Negative for frequent wheezing.      GASTROINTESTINAL:  Negative for abdominal pain and vomiting.      MUSCULOSKELETAL:  Positive for left leg pain.      NEUROLOGICAL:  Negative for dizziness and headaches.      PSYCHIATRIC:  Negative for depression and suicidal thoughts.          Past Medical History / Family History / Social History:         Last Reviewed on 6/19/2020 11:56 AM by Nury Singh    Past Medical History:             PREVENTIVE HEALTH MAINTENANCE             COLORECTAL CANCER SCREENING: Up to date (colonoscopy q10y; sigmoidoscopy q5y; Cologuard q3y) was last done at age 61; colonoscopy with normal results; [facility]         PAST MEDICAL HISTORY         Hypertension     COPD     Type 2 Diabetes         Surgical History:         umbilical hernia repair;         Family History:     Father: \"heart problems\"     Mother: Diabetes         Social History:     Occupation: Retired (Prior occupation: )     Marital Status:      Children: 1 child         Tobacco/Alcohol/Supplements:     Last Reviewed on 6/19/2020 11:37 AM by Montserrat Peters    Tobacco: Current Smoker: He currently smokes every day, 1-1/2 packs per day.          Current Problems:     Follow-up examination    Encounter for follow-up examination after completed treatment for conditions other than malignant neoplasm    Encounter for screening for depression    Nicotine dependence, unspecified, uncomplicated        Immunizations:     influenza, injectable, quadrivalent, preservative free 10/15/2019        Allergies:     Last Reviewed on 6/19/2020 11:38 AM by Montserrat Peters    Penicillins:          Current Medications:     Last Reviewed on 6/19/2020 11:35 AM by Montserrat Peters    HYDROcodone-acetaminophen 5-325 mg oral tablet [take 1 tablet by oral route every 4 hours as needed for pain]    warfarin 5 mg oral tablet [take 1 " tablet (5 mg) by oral route once daily]    lisinopriL 20 mg oral tablet [take 1 tablet (20 mg) by oral route once daily]    VITAMIN D2 (ERGO)50,000 IU CAP [TAKE 1 CAPSULE BY MOUTH TWICE A WEEK]        Objective:        Vitals:         Current: 6/19/2020 11:41:58 AM    Ht:  5 ft, 7 in;  Wt: 243.4 lbs;  BMI: 38.1T: 97.8 F (temporal);  BP: 89/50 mm Hg (left arm, sitting);  P: 102 bpm (left arm (BP Cuff), sitting)        Repeat:     11:42:11 AM  BP:   88/62mm Hg (left arm, sitting, manually taken)     Exams:     PHYSICAL EXAM:     GENERAL: well developed, well nourished;  no apparent distress;     RESPIRATORY: normal respiratory rate and pattern with no distress; normal breath sounds with no rales, rhonchi, wheezes or rubs;     CARDIOVASCULAR: rate is tachycardic;  rhythm is regular;     MUSCULOSKELETAL: normal gait; LLE with mild swelling;     NEUROLOGIC: mental status: alert and oriented x 3; GROSSLY INTACT     PSYCHIATRIC: appropriate affect and demeanor;         Assessment:         Z86.711   Personal history of pulmonary embolism       F17.200   Nicotine dependence, unspecified, uncomplicated       E11.9   Type 2 diabetes mellitus without complications       Z13.31   Encounter for screening for depression       Z79.01   Long term (current) use of anticoagulants       I95.9   Hypotension, unspecified           ORDERS:         Lab Orders:       [id number]  BDCBC - [facility] CBC with 3 part diff  (Send-Out)            04163  PRO-T - [facility] Prothrombin Time PT/INR  (In-House)            93690  COMP - [facility] Comp. Metabolic Panel  (Send-Out)            24059  A1CEG - [facility] Hemoglobin A1C  (Send-Out)              Other Orders:         Depression screen negative  (In-House)            4004F  Pt scrnd tobacco use rcvd tobacco cessation talk  (In-House)                      Plan:         Personal history of pulmonary embolismSpoke with on call physician Dr Carr. Will transfer care to Dr Carr for management of warfarin. Appointment  made for next week. Patient aware. He will continue Lovenox and warfarin as arranged. Checking PT/INR today with further recommendations to be made in dosing.    LABORATORY:  Labs ordered to be performed today include CBC and INR.            Orders:       [id number]  BDCB - [facility] CBC with 3 part diff  (Send-Out)            45448  PRO-T - [facility] Prothrombin Time PT/INR  (In-House)              Nicotine dependence, unspecified, uncomplicated        RECOMMENDATIONS given include: Counseled on smoking cessation and advised of the benefits to patient's health if he were to stop smoking. Counseling for less than 3 minutes.            Orders:       4004F  Pt scrnd tobacco use rcvd tobacco cessation talk  (In-House)              Type 2 diabetes mellitus without complicationsFollowed by endo.     LABORATORY:  Labs ordered to be performed today include Comprehensive metabolic panel and HgbA1C.            Orders:       13570  COMP - [facility] Comp. Metabolic Panel  (Send-Out)            39269  A1CEG - [facility] Hemoglobin A1C  (Send-Out)              Encounter for screening for depression    MIPS PHQ-9 Depression Screening: Completed form scanned and in chart; Total Score 1; Negative Depression Screen           Orders:         Depression screen negative  (In-House)              Long term (current) use of anticoagulantsAs above        Hypotension, unspecifiedHypotensive today with normal hypertension. Currently on Lisinopril 20 mg daily. Have instructed him to hold for now. Will be able to have BP checked when he gets his Lovenox injections. ER precautions given            Patient Recommendations:        For  Nicotine dependence, unspecified, uncomplicated:        Stop smoking.              Charge Capture:         Primary Diagnosis:     Z86.711  Personal history of pulmonary embolism           Orders:      16172  Transitional care manage service 14 day discharge  (In-House)            48527  PRO-T - [facility] Prothrombin Time PT/INR  (In-House)               F17.200  Nicotine dependence, unspecified, uncomplicated           Orders:      4004F  Pt scrnd tobacco use rcvd tobacco cessation talk  (In-House)              E11.9  Type 2 diabetes mellitus without complications     Z13.31  Encounter for screening for depression           Orders:        Depression screen negative  (In-House)              Z79.01  Long term (current) use of anticoagulants     I95.9  Hypotension, unspecified

## 2021-05-18 NOTE — PROGRESS NOTES
Dell Thorpe  1955     Office/Outpatient Visit    Visit Date: Wed, Jun 24, 2020 01:42 pm    Provider: Arun Carr MD (Assistant: Elis Mora MA)    Location: [facility]        Electronically signed by Arun Carr MD on  06/25/2020 06:10:40 PM                             Subjective:        CC: Tristan is a 65 year old male.  This is a follow-up visit.  blood clot in left leg         HPI: .7 on 6/19/20.       BP today is 128/85 with a HR of 107. He was on lisinopril 20 mg qd but has not been on this for the last week 2/2 hypotension.      A1c was 5.9 on 6/19/20. He has a h/o DM 2 and has been on farxiga and janumet for the last 2 years and has lost 142 lbs in the last 2 years by reducing calories.      Pt was admitted to [facility] from 6/1 to 6/15 for left leg DVT and PE. He was treated with lovenox and warfarin which was continued after discharge until he saw Nury Singh on 6/19. INR on 6/19 was 5.2 so he was instructed to stop lovenox. He has been taking warfarin 10 mg qd for the last 2 days despite being told to just take 5 mg.      Pt has COPD, he smoked 2 PPD from 14 to 65 (52 years) and currently smokes 1.5 PPD. He is not on any respiratory meds.    ROS:     CONSTITUTIONAL:  Negative for fatigue and fever.      EYES:  Negative for blurred vision.      E/N/T:  Negative for diminished hearing and nasal congestion.      CARDIOVASCULAR:  Negative for chest pain and palpitations.      RESPIRATORY:  Positive for dyspnea ( with mild exertion ).   Negative for recent cough.      GASTROINTESTINAL:  Negative for abdominal pain, constipation, diarrhea, nausea and vomiting.      GENITOURINARY:  Negative for dysuria.      MUSCULOSKELETAL:  Positive for limb pain ( left leg pain ).   Negative for arthralgias or myalgias.      NEUROLOGICAL:  Negative for paresthesias and weakness.      PSYCHIATRIC:  Negative for anxiety, depression and sleep disturbance.          Past Medical History /  "Family History / Social History:         Last Reviewed on 6/25/2020 06:04 PM by Arun Carr    Past Medical History:             PREVENTIVE HEALTH MAINTENANCE             COLORECTAL CANCER SCREENING: Up to date (colonoscopy q10y; sigmoidoscopy q5y; Cologuard q3y) was last done at age 61; colonoscopy with normal results; [facility]         PAST MEDICAL HISTORY         Hypertension     COPD     Type 2 Diabetes         Surgical History:         umbilical hernia repair;         Family History:     Father: \"heart problems\"     Mother: Diabetes         Social History:     Occupation: Retired (Prior occupation: )     Marital Status:      Children: 1 child         Tobacco/Alcohol/Supplements:     Last Reviewed on 6/25/2020 06:04 PM by Arun Carr    Tobacco: Current Smoker: He currently smokes every day, 1-1/2 packs per day.          Alcohol: Frequency:    4 days a week; When he drinks, the average quantity of alcohol is 1 pint drinks.   He typically consumes hard liquor.          Substance Abuse History:     Last Reviewed on 6/25/2020 06:04 PM by Arun Carr        Mental Health History:     Last Reviewed on 6/25/2020 06:04 PM by Arun Carr        Communicable Diseases (eg STDs):     Last Reviewed on 6/25/2020 06:04 PM by Arun Carr        Current Problems:     Last Reviewed on 6/25/2020 06:04 PM by Arun Carr    Follow-up examination    Encounter for follow-up examination after completed treatment for conditions other than malignant neoplasm    Encounter for screening for depression    Personal history of pulmonary embolism    Type 2 diabetes mellitus without complications    Nicotine dependence, unspecified, uncomplicated    Long term (current) use of anticoagulants    Chronic obstructive pulmonary disease, unspecified    Essential (primary) hypertension    Hypotension, unspecified        Immunizations:     influenza, injectable, quadrivalent, preservative free " 10/15/2019        Allergies:     Last Reviewed on 6/25/2020 06:04 PM by Arun Carr    Penicillins:          Current Medications:     Last Reviewed on 6/25/2020 06:04 PM by Arun Carr    HYDROcodone-acetaminophen 5-325 mg oral tablet [take 1 tablet by oral route every 4 hours as needed for pain]    warfarin 5 mg oral tablet [take 1 tablet (5 mg) by oral route once daily]    lisinopriL 20 mg oral tablet [take 1 tablet (20 mg) by oral route once daily]    VITAMIN D2 (ERGO)50,000 IU CAP  [TAKE 1 CAPSULE BY MOUTH TWICE A WEEK]    Farxiga 5 mg oral tablet [take 1 tablet (5 mg) by oral route once daily in the morning]    Janumet  mg oral tablet [take 1 tablet by oral route 2 times per day with meals]        Objective:        Vitals:         Current: 6/24/2020 1:52:15 PM    Ht:  5 ft, 7 in;  Wt: 244.6 lbs;  BMI: 38.3T: 97.5 F (temporal);  BP: 128/85 mm Hg (left arm, sitting);  P: 107 bpm (left arm (BP Cuff), sitting);  sCr: 0.85 mg/dL;  GFR: 94.07        Exams:     PHYSICAL EXAM:     GENERAL: Vitals recorded well developed, well nourished;  disheveled appearance, unkempt;  no apparent distress;     EYES: extraocular movements intact; conjunctiva and cornea are normal; PERRLA;     E/N/T: OROPHARYNX:  normal mucosa, dentition, gingiva, and posterior pharynx;     NECK: range of motion is normal; thyroid exam reveals not enlarged;     RESPIRATORY: normal respiratory rate and pattern with no distress; decreased breath sounds throughout;     CARDIOVASCULAR: mildly tachycardic;  rhythm is regular;  no systolic murmur; no edema;     GASTROINTESTINAL: nontender; normal bowel sounds;     LYMPHATIC: no enlargement of cervical or facial nodes;     MUSCULOSKELETAL: normal gait; normal overall tone     NEUROLOGIC: mental status: alert and oriented x 3; reflexes: knee jerks: 2+;     PSYCHIATRIC:  appropriate affect and demeanor; normal speech pattern; grossly normal memory;         Assessment:         I10   Essential  (primary) hypertension       E11.9   Type 2 diabetes mellitus without complications       Z86.711   Personal history of pulmonary embolism       J44.9   Chronic obstructive pulmonary disease, unspecified           ORDERS:         Meds Prescribed:       [New Rx] metoprolol succinate 25 mg oral Tablet, Extended Release 24 hr [take 1 tablet (25 mg) by oral route once daily], #90 (ninety) tablets, Refills: 0 (zero)       [New Rx] Advair -21 mcg/actuation Inhalation HFA Aerosol Inhaler [inhale 2 puffs by inhalation route 2 times per day in the morning andevening], #12 (twelve) grams, Refills: 0 (zero)         Radiology/Test Orders:       [id number]  Radiologic exam chest 2 views  (Send-Out)              Lab Orders:       FUTURE  Future order to be done at patients convenience  (Send-Out)            [id number]  [facility] CBC with 3 part diff  (Send-Out)            60278  [facility] Comp. Metabolic Panel  (Send-Out)            13942  [facility] TSH  (Send-Out)                      Plan:         Essential (primary) hypertensionHR is elevated today and last week so metoprolol 25 mg qd is added to lisinopril 20 mg qd.         FOLLOW-UP TESTING #1: FOLLOW-UP LABORATORY:  Labs to be scheduled in the future include CBC, CMP, and TSH.   Patient to schedule in 1week.            Prescriptions:       [New Rx] metoprolol succinate 25 mg oral Tablet, Extended Release 24 hr [take 1 tablet (25 mg) by oral route once daily], #90 (ninety) tablets, Refills: 0 (zero)           Orders:       FUTURE  Future order to be done at patients convenience  (Send-Out)            [id number]  [facility] CBC with 3 part diff  (Send-Out)            14317  [facility] Comp. Metabolic Panel  (Send-Out)            79423  TSH [facility] TSH  (Send-Out)              Type 2 diabetes mellitus without [id number] is currently in the pre-diabetic range. Will cont to monitor.         Personal history of pulmonary embolismPt educated regarding warfarin. She should follow  nurses' instructions regarding dose and not the bottle since dosage may change often. Cont warfarin 5 mg qd for now we will recheck INR on Friday 6/26.        Chronic obstructive pulmonary disease, unspecifiedPt has significant COPD, CXR is ordered to assess and pt is started on advair. If this is expensive then consider alternative such as symbicort.        RADIOLOGY:  I have ordered a chest x-ray (PA and lateral) to be done today.            Prescriptions:       [New Rx] Advair -21 mcg/actuation Inhalation HFA Aerosol Inhaler [inhale 2 puffs by inhalation route 2 times per day in the morning andevening], #12 (twelve) grams, Refills: 0 (zero)           Orders:       [id number]  Radiologic exam chest 2 views  (Send-Out)                  Patient Recommendations:        For  Essential (primary) hypertension:            The following laboratory testing has been ordered: CBC metabolic panel, comprehensive TSH Schedule a follow-up visit in 1 week.              Charge Capture:         Primary Diagnosis:     I10  Essential (primary) hypertension           Orders:      [id number]  Office/outpatient visit; established patient, level 4  (In-House)              E11.9  Type 2 diabetes mellitus without complications     Z86.711  Personal history of pulmonary embolism     J44.9  Chronic obstructive pulmonary disease, unspecified

## 2021-05-18 NOTE — PROGRESS NOTES
Dell Tohrpe  1955     Office/Outpatient Visit    Visit Date: Thu, Feb 25, 2021 02:31 pm    Provider: Nasir Mcdonald MD (Assistant: Myra Murguia MA)    Location: [facility]        Electronically signed by Nasir Mcdonald MD on  02/26/2021 06:42:44 PM                             Subjective:        CC: Tristan is a 66 year old White male.  Estabish care from Dr. Carr         HPI:           Tristan presents with type 2 diabetes mellitus without complications.  Specifically, this is type 2, non-insulin requiring diabetes without complications.  Compliance with treatment has been good; he takes his medication as directed and follows up as directed.  Current meds include an oral hypoglycemic ( Farxiga ( 5mg qd ) and Janumet (  mg BID ) ).  Most recent lab results include Hemoglobin A1c:  5.9 (%) (06/19/2020).  Concurrent health problems include hypertension and hypercholesterolemia.        Tristan has a history of pulmonary embolism.  He is currently on warfarin 5 mg daily.  He has been holding his warfarin due to an INR of 4.4.  He has been holding it for 2 days and his INR is increased to 5.7.  He denies any noncompliance with his medication.  He does say that he has drank a pretty good amount of alcohol but declines to say exactly how much over the last week or so.  He denies easy bleeding but does endorse easy bruising.          With regard to the essential (primary) hypertension, his current cardiac medication regimen includes a beta-blocker ( Toprol XL 50 mg QD ).  Compliance with treatment has been good; he takes his medication as directed and follows up as directed.  He is tolerating the medication well without side effects.  He has not kept a blood pressure diary, but states that pressures have been okay.        He has a prior history of vitamin D deficiency.  He currently takes 400 units of vitamin D3 daily.  He is unsure what his last vitamin D level was.          With regard to  "the hyperlipidemia, unspecified, current treatment includes Crestor.  Compliance with treatment has been good; he takes his medication as directed and follows up as directed.  He denies experiencing any hypercholesterolemia related symptoms.  He does not know results of any recent lab monitoring.  Concurrent health problems include hypertension and diabetes.      ROS:     CONSTITUTIONAL:  Negative for fatigue and fever.      EYES:  Negative for blurred vision.      E/N/T:  Negative for diminished hearing and nasal congestion.      CARDIOVASCULAR:  Negative for chest pain and palpitations.      RESPIRATORY:  Positive for dyspnea ( with mild exertion ).   Negative for recent cough.      GASTROINTESTINAL:  Negative for abdominal pain, constipation, diarrhea, nausea and vomiting.      GENITOURINARY:  Negative for dysuria.      MUSCULOSKELETAL:  Negative for arthralgias and myalgias.      NEUROLOGICAL:  Negative for headaches, paresthesias and weakness.      PSYCHIATRIC:  Negative for anxiety, depression and sleep disturbance.          Past Medical History / Family History / Social History:         Last Reviewed on 2/26/2021 06:42 PM by Nasir Mcdonald    Past Medical History:             PREVENTIVE HEALTH MAINTENANCE             COLORECTAL CANCER SCREENING: Up to date (colonoscopy q10y; sigmoidoscopy q5y; Cologuard q3y) was last done at age 61; colonoscopy with normal results; [facility]         PAST MEDICAL HISTORY         Hypertension     COPD     Type 2 Diabetes         Surgical History:         umbilical hernia repair;         Family History:     Father: \"heart problems\"     Mother: Diabetes         Social History:     Occupation: Retired (Prior occupation: )     Marital Status:      Children: 1 child         Tobacco/Alcohol/Supplements:     Last Reviewed on 2/26/2021 06:42 PM by Nasir Mcdonald    Tobacco: Current Smoker: He currently smokes every day, 1-1/2 packs per day.          Alcohol: " Frequency:    4 days a week; When he drinks, the average quantity of alcohol is 1 pint drinks.   He typically consumes hard liquor.          Substance Abuse History:     Last Reviewed on 2/26/2021 06:42 PM by Nasir Mcdonald        Mental Health History:     Last Reviewed on 2/26/2021 06:42 PM by Nasir Mcdonald        Communicable Diseases (eg STDs):     Last Reviewed on 2/26/2021 06:42 PM by Nasir Mcdonald        Current Problems:     Last Reviewed on 2/26/2021 06:42 PM by Nasir Mcdonald    Encounter for follow-up examination after completed treatment for conditions other than malignant neoplasm    Encounter for screening for depression    Nicotine dependence, unspecified, uncomplicated    Type 2 diabetes mellitus without complications    Personal history of pulmonary embolism    Long term (current) use of anticoagulants    Essential (primary) hypertension    Chronic obstructive pulmonary disease, unspecified    Hypotension, unspecified    Foot drop, left foot    Vitamin D deficiency, unspecified    Hyperlipidemia, unspecified    Encounter for immunization        Immunizations:     influenza, injectable, quadrivalent, preservative free 10/15/2019    influenza, injectable, quadrivalent, preservative free (FLUZONE QUAD [id number]) 9/14/2020        Allergies:     Last Reviewed on 2/26/2021 06:42 PM by Nasir Mcdonald    Penicillins:          Current Medications:     Last Reviewed on 2/26/2021 06:42 PM by Nasir Mcdonald    Farxiga 5 mg oral tablet [take 1 tablet (5 mg) by oral route once daily in the morning]    Janumet  mg oral tablet [take 1 tablet by oral route 2 times per day with meals]    rosuvastatin 20 mg oral tablet [TAKE 1 TABLET BY MOUTH NIGHTLY]    Vitamin D3 10 mcg (400 unit) oral tablet [Take 1 tab PO qd ]    metoprolol succinate 50 mg oral Tablet, Extended Release 24 hr [take 1 tablet (50 mg) by oral route once daily]    Advair -21 mcg/actuation Inhalation HFA Aerosol Inhaler [inhale 2 puffs by  inhalation route 2 times per day in the morning andevening]    warfarin 5 mg oral tablet [Take 1 tablet by mouth once daily]        Objective:        Vitals:         Current: 2/25/2021 2:39:31 PM    Ht:  5 ft, 7 in;  Wt: 257.2 lbs;  BMI: 40.3T: 96.2 F (temporal);  BP: 165/87 mm Hg (left arm, sitting);  P: 83 bpm (left arm (BP Cuff), sitting);  sCr: 0.69 mg/dL;  GFR: 116.87        Repeat:     2:39:49 PM  BP:   158/82mm Hg (left arm, sitting, P 80)     Exams:     PHYSICAL EXAM:     GENERAL: Vitals recorded well developed, well nourished;  disheveled appearance, unkempt;  no apparent distress;     EYES: extraocular movements intact; conjunctiva and cornea are normal; PERRLA;     E/N/T: OROPHARYNX:  normal mucosa, dentition, gingiva, and posterior pharynx;     NECK: range of motion is normal;     RESPIRATORY: normal respiratory rate and pattern with no distress; normal breath sounds with no rales, rhonchi, wheezes or rubs;     CARDIOVASCULAR: normal rate; rhythm is regular;  no systolic murmur; 1+ pedal edema;     GASTROINTESTINAL: nontender; normal bowel sounds;     NEUROLOGIC: mental status: alert and oriented x 3;     PSYCHIATRIC:  appropriate affect and demeanor; normal speech pattern; grossly normal memory;         Assessment:         E11.9   Type 2 diabetes mellitus without complications       Z86.711   Personal history of pulmonary embolism       Z79.01   Long term (current) use of anticoagulants       I10   Essential (primary) hypertension       E55.9   Vitamin D deficiency, unspecified       E78.5   Hyperlipidemia, unspecified           ORDERS:         Lab Orders:       [id number]  DIAB2 - [facility] CMP A1C LIPID AND MICRO ALBUM CR RATIO: [id number]  (Send-Out)            77727  BDCBC - [facility] CBC with 3 part diff  (Send-Out)            75818  TSH - [facility] TSH  (Send-Out)            63123  VITD - [facility] Vitamin D, 25 Hydroxy  (Send-Out)                      Plan:         Type 2 diabetes mellitus without  complications- Stable.  Continue Farxiga 5 mg daily and Janumet  mg twice daily.  A1c ordered today.    LABORATORY:  Labs ordered to be performed today include Diabetes Panel 2;CMP, A1C, Lipid, Microalbumin:Creatinine Ratio.            Orders:       [id number]  DIAB2 [facility] CMP A1C LIPID AND MICRO ALBUM CR RATIO: [id number]  (Send-Out)            35004  TSH - [facility] TSH  (Send-Out)              Personal history of pulmonary embolism- Stable.  Continue warfarin as directed.    LABORATORY:  Labs ordered to be performed today include CBC.            [facility]:       [id number]  [facility]              Long term (current) use of anticoagulantsINR not within range; most recent value 5.1.  Will hold warfarin until Monday at which time we'll resume warfarin if able.  Patient has been advised to cease alcohol intake. Call clinic or present to the emergency department for significant or uncontrolled bleeding.        Essential (primary) hypertensionNot controlled.  Continue Toprol-XL 50 mg daily.  Patient declines medication regimen changes at this time as he says his blood pressure has been well controlled at home.  Continue to monitor closely.        Vitamin D deficiency, unspecifiedUnknown control.  Continue vitamin D3 40 units daily.  Vitamin D level ordered today.          Orders:       [id number]  VITD [facility] Vitamin D, 25 Hydroxy  (Send-Out)              Hyperlipidemia, unspecifiedUnknown control.  Continue Crestor 20 mg daily.  Lipid panel ordered today.            Charge Capture:         Primary Diagnosis:     E11.9  Type 2 diabetes mellitus without complications           Orders:      [id number]  Office/outpatient visit; established patient, level 4  (In-House)              Z86.711  Personal history of pulmonary embolism     Z79.01  Long term (current) use of anticoagulants     I10  Essential (primary) hypertension     E55.9  Vitamin D deficiency, unspecified     E78.5  Hyperlipidemia,  unspecified

## 2021-05-18 NOTE — PROGRESS NOTES
ThorpeDell JERILYN  1955     Office/Outpatient Visit    Visit Date: Thu, Jul 2, 2020 11:59 am    Provider: Arun Carr MD (Assistant: Jennie Britton MA)    Location: [facility]        Electronically signed by Arun Carr MD on  07/06/2020 08:37:00 AM                             Subjective:        CC: Tristan is a 65 year old male.  Follow up on DVT         HPI:       BP today is 86/51 with a HR of 92. He is on metoprolol 25 mg qd. Lisinopril 20 mg qd was d/c'd 2/2 hypotension.      Pt dx'd with left leg DVT and PE on 6/1/20. He is on warfarin 5 mg qd. INR was 2.3 on 6/30/20.      Pt has COPD, he smoked 2 PPD from 14 to 65 (52 years) and currently smokes 1.5 PPD. He has not picked up advair yet.      He cannot dorsiflex his left foot ever since he fell and sprained his left ankle in April. This ankle sprain and subsequent foot drop was treated with 2 sessions of PT. He denies pain in left leg but does have numbness from knee down.    ROS:     CONSTITUTIONAL:  Negative for fatigue and fever.      EYES:  Negative for blurred vision.      E/N/T:  Negative for diminished hearing and nasal congestion.      CARDIOVASCULAR:  Negative for chest pain and palpitations.      RESPIRATORY:  Positive for dyspnea ( with mild exertion ).   Negative for recent cough.      GASTROINTESTINAL:  Negative for abdominal pain, constipation, diarrhea, nausea and vomiting.      GENITOURINARY:  Negative for dysuria.      MUSCULOSKELETAL:  Positive for limb pain ( left leg pain ).   Negative for arthralgias or myalgias.      NEUROLOGICAL:  Positive for paresthesia ( left lower extremity ) and weakness ( left lower extremity ).      PSYCHIATRIC:  Negative for anxiety, depression and sleep disturbance.          Past Medical History / Family History / Social History:         Last Reviewed on 6/25/2020 06:04 PM by Arun Carr    Past Medical History:             PREVENTIVE HEALTH MAINTENANCE             COLORECTAL CANCER  "SCREENING: Up to date (colonoscopy q10y; sigmoidoscopy q5y; Cologuard q3y) was last done at age 61; colonoscopy with normal results; [facility]         PAST MEDICAL HISTORY         Hypertension     COPD     Type 2 Diabetes         Surgical History:         umbilical hernia repair;         Family History:     Father: \"heart problems\"     Mother: Diabetes         Social History:     Occupation: Retired (Prior occupation: )     Marital Status:      Children: 1 child         Tobacco/Alcohol/Supplements:     Last Reviewed on 6/25/2020 06:04 PM by Arun Carr    Tobacco: Current Smoker: He currently smokes every day, 1-1/2 packs per day.          Alcohol: Frequency:    4 days a week; When he drinks, the average quantity of alcohol is 1 pint drinks.   He typically consumes hard liquor.          Substance Abuse History:     Last Reviewed on 6/25/2020 06:04 PM by Arun Carr        Mental Health History:     Last Reviewed on 6/25/2020 06:04 PM by Arun Carr        Communicable Diseases (eg STDs):     Last Reviewed on 6/25/2020 06:04 PM by Arun Carr        Current Problems:     Last Reviewed on 6/25/2020 06:04 PM by Arun Carr    Follow-up examination    Encounter for follow-up examination after completed treatment for conditions other than malignant neoplasm    Encounter for screening for depression    Personal history of pulmonary embolism    Type 2 diabetes mellitus without complications    Nicotine dependence, unspecified, uncomplicated    Long term (current) use of anticoagulants    Chronic obstructive pulmonary disease, unspecified    Essential (primary) hypertension    Hypotension, unspecified        Immunizations:     influenza, injectable, quadrivalent, preservative free 10/15/2019        Allergies:     Last Reviewed on 6/25/2020 06:04 PM by Arun Carr    Penicillins:          Current Medications:     Last Reviewed on 6/25/2020 06:04 PM by Arun Carr    " HYDROcodone-acetaminophen 5-325 mg oral tablet [take 1 tablet by oral route every 4 hours as needed for pain]    warfarin 5 mg oral tablet [take 1 tablet (5 mg) by oral route once daily]    lisinopriL 20 mg oral tablet [take 1 tablet (20 mg) by oral route once daily]    Farxiga 5 mg oral tablet [take 1 tablet (5 mg) by oral route once daily in the morning]    Janumet  mg oral tablet [take 1 tablet by oral route 2 times per day with meals]    VITAMIN D2 (ERGO)50,000 IU CAP  [TAKE 1 CAPSULE BY MOUTH TWICE A WEEK]    metoprolol succinate 25 mg oral Tablet, Extended Release 24 hr [take 1 tablet (25 mg) by oral route once daily]    Advair -21 mcg/actuation Inhalation HFA Aerosol Inhaler [inhale 2 puffs by inhalation route 2 times per day in the morning andevening]        Objective:        Vitals:         Current: 7/2/2020 12:05:39 PM    Ht:  5 ft, 7 in;  Wt: 244 lbs;  BMI: 38.2T: 98.4 F (temporal);  BP: 86/51 mm Hg (left arm, sitting);  P: 92 bpm (left arm (BP Cuff), sitting);  sCr: 0.69 mg/dL;  GFR: 115.76        Exams:     PHYSICAL EXAM:     GENERAL: Vitals recorded well developed, well nourished;  disheveled appearance, unkempt;  no apparent distress;     EYES: extraocular movements intact; conjunctiva and cornea are normal; PERRLA;     E/N/T: OROPHARYNX:  normal mucosa, dentition, gingiva, and posterior pharynx;     NECK: range of motion is normal;     RESPIRATORY: normal respiratory rate and pattern with no distress; decreased breath sounds throughout;     CARDIOVASCULAR: mildly tachycardic;  rhythm is regular;  no systolic murmur; 2+ pedal and pitting, left side edema;     GASTROINTESTINAL: nontender; normal bowel sounds;     MUSCULOSKELETAL: normal gait; muscle strength: 0/5 L, 5/5 R ankle (dorsi)flexors;  5/5 bilat ankle extensors;  normal overall tone     NEUROLOGIC: mental status: alert and oriented x 3;     PSYCHIATRIC:  appropriate affect and demeanor; normal speech pattern; grossly normal  memory;         Assessment:         I10   Essential (primary) hypertension       Z86.711   Personal history of pulmonary embolism       J44.9   Chronic obstructive pulmonary disease, unspecified       M21.372   Foot drop, left foot           ORDERS:         Radiology/Test Orders:       [id number]  Needle electromyography, one extremity with or without related paraspinal areas  (Send-Out)              Procedures Ordered:       RFPT  Physical/Occupational Therapy Referral  (Send-Out)            REFER  Referral to Specialist or Other Facility  (Send-Out; Stat)                      Plan:         Essential (primary) hypertensionBP today is very low but he denies dizziness or lightheadedness. Repeat BP check requested but this either did not materialize or was not recorded. Cont metoprolol 25 mg qd.        Personal history of pulmonary embolismCont warfarin and pt referred to vascular surgery.         REFERRALS:  Referral initiated to.            Orders:       REFER  Referral to Specialist or Other Facility  (Send-Out; Stat)              Chronic obstructive pulmonary disease, unspecifiedPt encouraged to fill advair. COPD appears stable at this time.        Foot drop, left footPt is again referred to PT for left foot drop and EMG is ordered to assess. Consider referral to ortho or neuro. CN are intact and no other concern for stroke. Pt is on warfarin for DVT/PE.        TESTS/PROCEDURES:  Will proceed with an EMG and nerve conduction study to be performed/scheduled now.      REFERRALS:  Referral initiated to physical therapy ( [facility] ) for evaluation and treatment.            Orders:       RFPT  Physical/Occupational Therapy Referral  (Send-Out)            28039  Needle electromyography, one extremity with or without related paraspinal areas  (Send-Out)                  Charge Capture:         Primary Diagnosis:     I10  Essential (primary) hypertension           Orders:      88420   Office/outpatient visit; established patient, level 4  (In-House)              Z86.711  Personal history of pulmonary embolism     J44.9  Chronic obstructive pulmonary disease, unspecified     M21.372  Foot drop, left foot

## 2021-05-18 NOTE — PROGRESS NOTES
Dell Thorpe  1955     Office/Outpatient Visit    Visit Date: Mon, Sep 14, 2020 04:04 pm    Provider: Arun Carr MD (Assistant: Monik Love LPN)    Location: [facility]        Electronically signed by Arun Carr MD on  09/14/2020 07:13:04 PM                             Subjective:        CC: Tristan is a 65 year old White male.  This is a follow-up visit.          HPI:       BP today is 127/89 with a HR of 93. He is on metoprolol 25 gm qd.      A1c was 5.9 on 6/19/20. He is on janumet 50/500 mg qd. He admits to eating too many sweets.       Pt has COPD, he smoked 2 PPD from 14 to 65 (52 years) and currently smokes 1.5 PPD. He never got the advair I prescribed at our last 2 visits. Pt reports his shortness of breath is at baseline, SOB with moderate exertion.      Swelling in left foot is gradually improving and he can dorsiflex his left foot just a small amount. He has not started PT yet. He has numbness from knee down his left leg.    ROS:     CONSTITUTIONAL:  Negative for fatigue and fever.      EYES:  Negative for blurred vision.      E/N/T:  Negative for diminished hearing and nasal congestion.      CARDIOVASCULAR:  Negative for chest pain and palpitations.      RESPIRATORY:  Positive for dyspnea ( with mild exertion ).   Negative for recent cough.      GASTROINTESTINAL:  Negative for abdominal pain, constipation, diarrhea, nausea and vomiting.      GENITOURINARY:  Negative for dysuria.      MUSCULOSKELETAL:  Positive for limb pain ( left leg pain ).   Negative for arthralgias or myalgias.      NEUROLOGICAL:  Positive for paresthesia ( left lower extremity ) and weakness ( left lower extremity ).      PSYCHIATRIC:  Negative for anxiety, depression and sleep disturbance.          Past Medical History / Family History / Social History:         Last Reviewed on 9/14/2020 07:08 PM by Arun Carr    Past Medical History:             PREVENTIVE HEALTH MAINTENANCE      "        COLORECTAL CANCER SCREENING: Up to date (colonoscopy q10y; sigmoidoscopy q5y; Cologuard q3y) was last done at age 61; colonoscopy with normal results; [facility]         PAST MEDICAL HISTORY         Hypertension     COPD     Type 2 Diabetes         Surgical History:         umbilical hernia repair;         Family History:     Father: \"heart problems\"     Mother: Diabetes         Social History:     Occupation: Retired (Prior occupation: )     Marital Status:      Children: 1 child         Tobacco/Alcohol/Supplements:     Last Reviewed on 9/14/2020 07:08 PM by Arun Carr    Tobacco: Current Smoker: He currently smokes every day, 1-1/2 packs per day.          Alcohol: Frequency:    4 days a week; When he drinks, the average quantity of alcohol is 1 pint drinks.   He typically consumes hard liquor.          Substance Abuse History:     Last Reviewed on 9/14/2020 07:08 PM by Arun Carr        Mental Health History:     Last Reviewed on 9/14/2020 07:08 PM by Arun Carr        Communicable Diseases (eg STDs):     Last Reviewed on 9/14/2020 07:08 PM by Arun Carr        Current Problems:     Last Reviewed on 9/14/2020 07:08 PM by Arun Carr    Encounter for follow-up examination after completed treatment for conditions other than malignant neoplasm    Encounter for screening for depression    Type 2 diabetes mellitus without complications    Nicotine dependence, unspecified, uncomplicated    Personal history of pulmonary embolism    Long term (current) use of anticoagulants    Essential (primary) hypertension    Chronic obstructive pulmonary disease, unspecified    Hypotension, unspecified    Foot drop, left foot    Vitamin D deficiency, unspecified    Hyperlipidemia, unspecified    Encounter for immunization        Immunizations:     influenza, injectable, quadrivalent, preservative free 10/15/2019        Allergies:     Last Reviewed on 9/14/2020 07:08 PM by " Arun Carr    Penicillins:          Current Medications:     Last Reviewed on 9/14/2020 07:08 PM by Arun Carr    Farxiga 5 mg oral tablet [take 1 tablet (5 mg) by oral route once daily in the morning]    Janumet  mg oral tablet [take 1 tablet by oral route 2 times per day with meals]    rosuvastatin 20 mg oral tablet [TAKE 1 TABLET BY MOUTH NIGHTLY]    Vitamin D3 10 mcg (400 unit) oral tablet [Take 1 tab PO qd ]    metoprolol succinate 25 mg oral Tablet, Extended Release 24 hr [take 1 tablet (25 mg) by oral route once daily]    Advair -21 mcg/actuation Inhalation HFA Aerosol Inhaler [inhale 2 puffs by inhalation route 2 times per day in the morning andevening]    warfarin 5 mg oral tablet [take 1 tablet (5 mg) by oral route once daily]        Objective:        Vitals:         Current: 9/14/2020 4:12:24 PM    Ht:  5 ft, 7 in;  Wt: 232.8 lbs;  BMI: 36.5T: 97.8 F (temporal);  BP: 127/89 mm Hg (left arm, sitting);  P: 93 bpm (left arm (BP Cuff), sitting);  sCr: 0.69 mg/dL;  GFR: 113.47        Exams:     PHYSICAL EXAM:     GENERAL: Vitals recorded well developed, well nourished;  disheveled appearance, unkempt;  no apparent distress;     EYES: extraocular movements intact; conjunctiva and cornea are normal; PERRLA;     E/N/T: OROPHARYNX:  normal mucosa, dentition, gingiva, and posterior pharynx;     NECK: range of motion is normal;     RESPIRATORY: normal respiratory rate and pattern with no distress; normal breath sounds with no rales, rhonchi, wheezes or rubs;     CARDIOVASCULAR: mildly tachycardic;  rhythm is regular;  no systolic murmur; 1+ pedal and pitting, left side edema;     GASTROINTESTINAL: nontender; normal bowel sounds;     MUSCULOSKELETAL: normal gait; muscle strength: 1/5 L, 5/5 R ankle (dorsi)flexors;  5/5 bilat ankle extensors;  normal overall tone     NEUROLOGIC: mental status: alert and oriented x 3;     PSYCHIATRIC:  appropriate affect and demeanor; normal speech pattern;  grossly normal memory;         Assessment:         I10   Essential (primary) hypertension       E11.9   Type 2 diabetes mellitus without complications       J44.9   Chronic obstructive pulmonary disease, unspecified       M21.372   Foot drop, left foot       Z23   Encounter for immunization           ORDERS:         Meds Prescribed:       [Refilled] Advair -21 mcg/actuation Inhalation HFA Aerosol Inhaler [inhale 2 puffs by inhalation route 2 times per day in the morning andevening], #12 (twelve) grams, Refills: 1 (one)       [Refilled] metoprolol succinate 50 mg oral Tablet, Extended Release 24 hr [take 1 tablet (50 mg) by oral route once daily], #90 (ninety) tablets, Refills: 0 (zero)         Radiology/Test Orders:       [id number]  CLARE testing  (Send-Out)              Lab Orders:       FUTURE  Future order to be done at patients convenience  (Send-Out)            60948  BDCB - [facility] CBC with 3 part diff  (Send-Out)            19379  DIAB1 - [facility] LIPID,CMP, A1C: 93071, 74865, 61333  (Send-Out)            85199  TSH - [facility] TSH  (Send-Out)            77959  VITD - [facility] Vitamin D, 25 Hydroxy  (Send-Out)              Procedures Ordered:       RFPT  Physical/Occupational Therapy Referral  (Send-Out)              Other Orders:       15951  Influenza virus vaccine, quadrivalent, split virus, preservative free 3 years of age & older  (In-House)              Administration of influenza virus vaccine  (x1)                  Plan:         Essential (primary) hypertensionHR is slightly elevated so metoprolol is increased from 25 to 50 mg qd.           Prescriptions:       [Refilled] metoprolol succinate 50 mg oral Tablet, Extended Release 24 hr [take 1 tablet (50 mg) by oral route once daily], #90 (ninety) tablets, Refills: 0 (zero)         Type 2 diabetes mellitus without [id number] was in the prediabetic range, will recheck and adjust medications if needed. Cont janumet 50/500 mg qd.        FOLLOW-UP TESTING #1:  FOLLOW-UP LABORATORY:  Labs to be scheduled in the future include CBC, Diabetes Panel 1; CMP, Lipid, A1C, TSH, and Vitamin D 25.            Orders:       FUTURE  Future order to be done at patients convenience  (Send-Out)            67797  BDCB - [facility] CBC with 3 part diff  (Send-Out)            23277  DIAB1 - [facility] LIPID,CMP, A1C: 20260, 07466, 60986  (Send-Out)            25677  TSH - [facility] TSH  (Send-Out)            70056  VITD - [facility] Vitamin D, 25 Hydroxy  (Send-Out)              Chronic obstructive pulmonary disease, unspecifiedAdvair is again prescribed for COPD and pt agrees to go straight to [facility] to pick this up after our visit.           Prescriptions:       [Refilled] Advair -21 mcg/actuation Inhalation HFA Aerosol Inhaler [inhale 2 puffs by inhalation route 2 times per day in the morning andevening], #12 (twelve) grams, Refills: 1 (one)         Foot drop, left footPt again referred to PT for left foot drop.         RADIOLOGY:  I have ordered CLARE testing to be done today.      REFERRALS:  Referral initiated to physical therapy ( [facility] ) for evaluation and treatment.            Orders:       RFPT  Physical/Occupational Therapy Referral  (Send-Out)            78879  CLARE testing  (Send-Out)              Encounter for immunization          Immunizations:       21298  Influenza virus vaccine, quadrivalent, split virus, preservative free 3 years of age & older  (In-House)                Dose (ml): 0.5  Site: right deltoid  Route: intramuscular  Administered by: Barbara Webb          : [employer]  Lot #: [id number]  Exp: 06/30/2021          NDC: [address]-62        Administration of influenza virus vaccine  (x1)              Patient Recommendations:        For  Type 2 diabetes mellitus without complications:            The following laboratory testing has been ordered: CBC TSH             Charge Capture:         Primary Diagnosis:     I10   Essential (primary) hypertension           Orders:      36627  Office/outpatient visit; established patient, level 4  (In-House)              E11.9  Type 2 diabetes mellitus without complications     J44.9  Chronic obstructive pulmonary disease, unspecified     M21.372  Foot drop, left foot     Z23  Encounter for immunization           Orders:      50456  Influenza virus vaccine, quadrivalent, split virus, preservative free 3 years of age & older  (In-House)              Administration of influenza virus vaccine  (x1)

## 2021-05-18 NOTE — PROGRESS NOTES
Dell Thorpe  1955     Office/Outpatient Visit    Visit Date: Wed, Aug 5, 2020 03:59 pm    Provider: Arun Carr MD (Assistant: Rosalind Escudero RN)    Location: [facility]        Electronically signed by Arun Carr MD on  08/30/2020 12:44:18 PM                             Subjective:        CC: Tristan is a 65 year old White male.  This is a follow-up visit.  Follow-up visit, needs refills on Advair, lortab, Vitamin D. States Janumet should be daily         HPI:       BP today is 130/79 with a HR of 89. He is on metoprolol 25 gm qd,       A1c was 5.9 on 6/19/20. He is on janumet 50/500 mg qd. He admits to eating too many sweets.      Pt has COPD, he smoked 2 PPD from 14 to 65 (52 years) and currently smokes 1.5 PPD. He says he never got the advair I prescribed at his last visit. Pt reports his shortness of breath is at baseline, SOB with moderate exertion.      He is on rosuvastatin 20 mg qd for HLD, he needs a refill.       Pt is on vitamin D2 50,000 units twice a week, this was started by a nurse practitioner at his endocrinologist (DM 2) office 2 years ago.       He cannot dorsiflex his left foot ever since he fell and sprained his left ankle in April. This ankle sprain and subsequent foot drop was treated with 2 sessions of PT. He denies pain in left leg but does have numbness from knee down.     ROS:     CONSTITUTIONAL:  Negative for fatigue and fever.      EYES:  Negative for blurred vision.      E/N/T:  Negative for diminished hearing and nasal congestion.      CARDIOVASCULAR:  Negative for chest pain and palpitations.      RESPIRATORY:  Positive for dyspnea ( with mild exertion ).   Negative for recent cough.      GASTROINTESTINAL:  Negative for abdominal pain, constipation, diarrhea, nausea and vomiting.      GENITOURINARY:  Negative for dysuria.      MUSCULOSKELETAL:  Positive for limb pain ( left leg pain ).   Negative for arthralgias or myalgias.      NEUROLOGICAL:   "Positive for paresthesia ( left lower extremity ) and weakness ( left lower extremity ).      PSYCHIATRIC:  Negative for anxiety, depression and sleep disturbance.          Past Medical History / Family History / Social History:         Last Reviewed on 8/05/2020 04:34 PM by Arun Carr    Past Medical History:             PREVENTIVE HEALTH MAINTENANCE             COLORECTAL CANCER SCREENING: Up to date (colonoscopy q10y; sigmoidoscopy q5y; Cologuard q3y) was last done at age 61; colonoscopy with normal results; [facility]         PAST MEDICAL HISTORY         Hypertension     COPD     Type 2 Diabetes         Surgical History:         umbilical hernia repair;         Family History:     Father: \"heart problems\"     Mother: Diabetes         Social History:     Occupation: Retired (Prior occupation: )     Marital Status:      Children: 1 child         Tobacco/Alcohol/Supplements:     Last Reviewed on 8/05/2020 04:34 PM by Arun Carr    Tobacco: Current Smoker: He currently smokes every day, 1-1/2 packs per day.          Alcohol: Frequency:    4 days a week; When he drinks, the average quantity of alcohol is 1 pint drinks.   He typically consumes hard liquor.          Substance Abuse History:     Last Reviewed on 8/05/2020 04:34 PM by Arun Carr        Mental Health History:     Last Reviewed on 8/05/2020 04:34 PM by Arun Carr        Communicable Diseases (eg STDs):     Last Reviewed on 8/05/2020 04:34 PM by Arun Carr        Current Problems:     Last Reviewed on 8/05/2020 04:34 PM by Arun Carr    Follow-up examination    Encounter for follow-up examination after completed treatment for conditions other than malignant neoplasm    Encounter for screening for depression    Nicotine dependence, unspecified, uncomplicated    Personal history of pulmonary embolism    Type 2 diabetes mellitus without complications    Long term (current) use of anticoagulants    " Essential (primary) hypertension    Chronic obstructive pulmonary disease, unspecified    Hypotension, unspecified    Foot drop, left foot    Vitamin D deficiency, unspecified    Hyperlipidemia, unspecified        Immunizations:     influenza, injectable, quadrivalent, preservative free 10/15/2019        Allergies:     Last Reviewed on 8/05/2020 04:34 PM by Arun Carr    Penicillins:          Current Medications:     Last Reviewed on 8/05/2020 04:34 PM by Arun Carr    Farxiga 5 mg oral tablet [take 1 tablet (5 mg) by oral route once daily in the morning]    Janumet  mg oral tablet [take 1 tablet by oral route 2 times per day with meals]    HYDROcodone-acetaminophen 5-325 mg oral tablet [take 1 tablet by oral route every 4 hours as needed for pain]    VITAMIN D2 (ERGO)50,000 IU CAP  [TAKE 1 CAPSULE BY MOUTH TWICE A WEEK]    metoprolol succinate 25 mg oral Tablet, Extended Release 24 hr [take 1 tablet (25 mg) by oral route once daily]    Advair -21 mcg/actuation Inhalation HFA Aerosol Inhaler [inhale 2 puffs by inhalation route 2 times per day in the morning andevening]    warfarin 5 mg oral tablet [take 1 tablet (5 mg) by oral route once daily]    ROSUVASTATIN 20MG TAB [TAKE 1 TABLET BY MOUTH NIGHTLY]        Objective:        Vitals:         Current: 8/5/2020 4:06:18 PM    Ht:  5 ft, 7 in;  Wt: 244 lbs;  BMI: 38.2T: 99.2 F (temporal);  BP: 130/79 mm Hg (left arm, sitting);  P: 89 bpm (left arm (BP Cuff), sitting);  sCr: 0.69 mg/dL;  GFR: 115.76        Exams:     PHYSICAL EXAM:     GENERAL: Vitals recorded well developed, well nourished;  disheveled appearance, unkempt;  no apparent distress;     EYES: extraocular movements intact; conjunctiva and cornea are normal; PERRLA;     E/N/T: OROPHARYNX:  normal mucosa, dentition, gingiva, and posterior pharynx;     NECK: range of motion is normal;     RESPIRATORY: normal respiratory rate and pattern with no distress; decreased breath sounds  throughout;     CARDIOVASCULAR: mildly tachycardic;  rhythm is regular;  no systolic murmur; 2+ pedal and pitting, left side edema;     GASTROINTESTINAL: nontender; normal bowel sounds;     MUSCULOSKELETAL: normal gait; muscle strength: 0/5 L, 5/5 R ankle (dorsi)flexors;  5/5 bilat ankle extensors;  normal overall tone     NEUROLOGIC: mental status: alert and oriented x 3;     PSYCHIATRIC:  appropriate affect and demeanor; normal speech pattern; grossly normal memory;         Assessment:         I10   Essential (primary) hypertension       E11.9   Type 2 diabetes mellitus without complications       J44.9   Chronic obstructive pulmonary disease, unspecified       E78.5   Hyperlipidemia, unspecified       E55.9   Vitamin D deficiency, unspecified       M21.372   Foot drop, left foot           ORDERS:         Meds Prescribed:       [Refilled] Advair -21 mcg/actuation Inhalation HFA Aerosol Inhaler [inhale 2 puffs by inhalation route 2 times per day in the morning andevening], #12 (twelve) grams, Refills: 1 (one)       [Refilled] Vitamin D3 10 mcg (400 unit) oral tablet [Take 1 tab PO qd ], #90 (ninety) tablets, Refills: 0 (zero)       [Refilled] rosuvastatin 20 mg oral tablet [TAKE 1 TABLET BY MOUTH NIGHTLY], #90 (ninety) tablets, Refills: 3 (three)         Lab Orders:       APPTO  Appointment need  (In-House)              Procedures Ordered:       RFPT  Physical/Occupational Therapy Referral  (Send-Out)                      Plan:         Essential (primary) hypertensionBP is well controlled, cont metoprolol 25 mg qd.         Type 2 diabetes mellitus without complicationsDM 2 is well controlled and even in the pre-diabetic range. Cont janumet 50/500 mg qd, farxiga 5 mg qd, and bydureon once a week.         FOLLOW-UP: Schedule a follow-up appointment in 6 weeks.:.            Orders:       APPTO  Appointment need  (In-House)              Chronic obstructive pulmonary disease, unspecifiedAdvair is again sent in  for COPD.           Prescriptions:       [Refilled] Advair -21 mcg/actuation Inhalation HFA Aerosol Inhaler [inhale 2 puffs by inhalation route 2 times per day in the morning andevening], #12 (twelve) grams, Refills: 1 (one)         Hyperlipidemia, unspecifiedCont rosuvastatin 20 mg qd for HLD.           Prescriptions:       [Refilled] rosuvastatin 20 mg oral tablet [TAKE 1 TABLET BY MOUTH NIGHTLY], #90 (ninety) tablets, Refills: 3 (three)         Vitamin D deficiency, unspecifiedCont vitamin D3 400 units qd, will check level with next set of labs.           Prescriptions:       [Refilled] Vitamin D3 10 mcg (400 unit) oral tablet [Take 1 tab PO qd ], #90 (ninety) tablets, Refills: 0 (zero)         Foot drop, left footPt is again referred to PT for left foot drop.         REFERRALS:  Referral initiated to physical therapy ( [facility] ) for evaluation and treatment.            Orders:       RFPT  Physical/Occupational Therapy Referral  (Send-Out)                  Patient Recommendations:        For  Type 2 diabetes mellitus without complications:    Schedule a follow-up visit in 6 weeks.                APPOINTMENT INFORMATION:        Monday Tuesday Wednesday Thursday Friday Saturday Sunday            Time:___________________AM  PM   Date:_____________________             Charge Capture:         Primary Diagnosis:     I10  Essential (primary) hypertension           Orders:      21971  Office/outpatient visit; established patient, level 4  (In-House)              E11.9  Type 2 diabetes mellitus without complications           Orders:      APPTO  Appointment need  (In-House)              J44.9  Chronic obstructive pulmonary disease, unspecified     E78.5  Hyperlipidemia, unspecified     E55.9  Vitamin D deficiency, unspecified     M21.372  Foot drop, left foot

## 2021-05-25 ENCOUNTER — OFFICE VISIT CONVERTED (OUTPATIENT)
Dept: FAMILY MEDICINE CLINIC | Age: 66
End: 2021-05-25
Attending: FAMILY MEDICINE

## 2021-06-05 NOTE — PROGRESS NOTES
Dell Thorpe  1955     Office/Outpatient Visit    Visit Date: Tue, May 25, 2021 02:05 pm    Provider: Nasir Mcdonald MD (Assistant: Jennie Noble,  )    Location: [facility]        Electronically signed by Nasir Mcdonald MD on  05/25/2021 05:41:16 PM                             Subjective:        CC:     HPI:           Tristan is here for a Medicare wellness visit.  The required HRA questions are integrated within this visit note. Family medical history and individual medical/surgical history were reviewed and updated.  A current height, weight, BMI, blood pressure, and pulse were recorded in the vitals section of the note and have been reviewed. Patient's medications, including supplements, were recorded in the chart and reviewed.          Self-Assessment of Health: He rates his health as very good. He rates his confidence of being able to control/manage most of his health problems as very confident. His physical/emotional health has limited his social activites slightly.  A review of cognitive impairment was performed, including ability to drive a car, manage finances, and any memory changes, and was found to be negative.  A review of functional ability, including bathing, dressing, walking, and urine/bowel continence as well as level of safety was performed and was found to be negative.  Falls Risk: Has not had any falls or only one fall without injury in the past year.  In regard to hearing, he reports having to strain to hear or understand conversations, but not having trouble hearing the TV/radio when others do not or wearing hearing aid(s).  Concerning home safety, he reports that at home he DOES have adequate lighting, a skid resistant shower/tub, grab bars in the bath, handrails on stairs and functioning smoke alarms, but not absence of throw rugs.          Immunization Status: ** Has not received pneumococcal vaccination; Physical Activity: He never excercises.; Type of diet  patient normally eats is described as well-balanced with fruits and vegetables Tobacco: Current Smoker: He currently smokes every day, 1-1/2 packs per day.  Preventative Health updated today.            PHQ-9 Depression Screening: Completed form scanned and in chart; Total Score 0           Concerning type 2 diabetes mellitus without complications, specifically, this is type 2, non-insulin requiring diabetes without complications.  Compliance with treatment has been good; he takes his medication as directed and follows up as directed.  Current meds include an oral hypoglycemic ( Farxiga ( 5mg qd ) and Janumet (  mg BID ) ).  Most recent lab results include Hemoglobin A1c:  6.9 (%) (03/01/2021).  Concurrent health problems include hypertension and hypercholesterolemia.        Tristan has a history of pulmonary embolism.  He is currently on warfarin 7.5 mg MWF and 5 mg daily rest.  His most recent INR was 2.7. He is getting a repeat drawn today. He denies any noncompliance with his medication.  He denies easy bleeding but does endorse easy bruising.          Concerning essential (primary) hypertension, his current cardiac medication regimen includes a beta-blocker ( Toprol XL 50 mg QD ).  Compliance with treatment has been good; he takes his medication as directed and follows up as directed.  He is tolerating the medication well without side effects.  He has not kept a blood pressure diary, but states that pressures have been okay.            In regard to the hyperlipidemia, unspecified, current treatment includes Crestor.  Compliance with treatment has been good; he takes his medication as directed and follows up as directed.  He denies experiencing any hypercholesterolemia related symptoms.  Most recent lab tests include Total Cholesterol:  165 (mg/dL) (03/01/2021), HDL:  74 (mg/dL) (03/01/2021), Triglycerides:  99 (mg/dL) (03/01/2021), LDL:  71 (mg/dL) (03/01/2021).  Concurrent health problems include  "hypertension and diabetes.      ROS:     CONSTITUTIONAL:  Negative for fatigue and fever.      EYES:  Negative for blurred vision.      E/N/T:  Negative for diminished hearing and nasal congestion.      CARDIOVASCULAR:  Negative for chest pain and palpitations.      RESPIRATORY:  Positive for dyspnea ( with mild exertion ).   Negative for recent cough.      GASTROINTESTINAL:  Negative for abdominal pain, constipation, diarrhea, nausea and vomiting.      GENITOURINARY:  Negative for dysuria.      MUSCULOSKELETAL:  Negative for arthralgias and myalgias.      NEUROLOGICAL:  Negative for headaches, paresthesias and weakness.      HEMATOLOGIC/LYMPHATIC:  Positive for easy bruising.      PSYCHIATRIC:  Negative for anxiety, depression and sleep disturbance.          Past Medical History / Family History / Social History:         Last Reviewed on 5/25/2021 02:44 PM by Nasir Mcdonald    Past Medical History:             PREVENTIVE HEALTH MAINTENANCE             COLORECTAL CANCER SCREENING: Up to date (colonoscopy q10y; sigmoidoscopy q5y; Cologuard q3y) was last done at age 61; colonoscopy with normal results; [facility]         PAST MEDICAL HISTORY         Hypertension     COPD     Type 2 Diabetes         Surgical History:         umbilical hernia repair;         Family History:     Father: \"heart problems\"     Mother: Diabetes         Social History:     Occupation: Retired (Prior occupation: )     Marital Status:      Children: 1 child         Tobacco/Alcohol/Supplements:     Last Reviewed on 5/25/2021 02:44 PM by Nasir Mcdonald    Tobacco: Current Smoker: He currently smokes every day, 1-1/2 packs per day.          Alcohol: Frequency:    4 days a week; When he drinks, the average quantity of alcohol is 1 pint drinks.   He typically consumes hard liquor.          Substance Abuse History:     Last Reviewed on 5/25/2021 02:44 PM by Nasir Mcdonald        Mental Health History:     Last Reviewed on 5/25/2021 " 02:44 PM by Nasir Mcdonald        Communicable Diseases (eg STDs):     Last Reviewed on 5/25/2021 02:44 PM by Nasir Mcdonald        Current Problems:     Last Reviewed on 5/25/2021 02:44 PM by Nasir Mcdonald    Encounter for follow-up examination after completed treatment for conditions other than malignant neoplasm    Encounter for screening for depression    Type 2 diabetes mellitus without complications    Nicotine dependence, unspecified, uncomplicated    Personal history of pulmonary embolism    Long term (current) use of anticoagulants    Essential (primary) hypertension    Chronic obstructive pulmonary disease, unspecified    Hypotension, unspecified    Foot drop, left foot    Vitamin D deficiency, unspecified    Hyperlipidemia, unspecified    Encounter for immunization    Encounter for general adult medical examination without abnormal findings        Immunizations:     influenza, injectable, quadrivalent, preservative free 10/15/2019    influenza, injectable, quadrivalent, preservative free (FLUZONE QUAD [id number]) 9/14/2020    SARS-COV-2 (COVID-19) vaccine, UNSPECIFIED 5/24/2021        Allergies:     Last Reviewed on 5/25/2021 02:44 PM by Nasir Mcdonald    Penicillins:          Current Medications:     Last Reviewed on 5/25/2021 02:44 PM by Nasir Mcdonald    Farxiga 5 mg oral tablet [take 1 tablet (5 mg) by oral route once daily in the morning]    Janumet  mg oral tablet [take 1 tablet by oral route 2 times per day with meals]    rosuvastatin 20 mg oral tablet [TAKE 1 TABLET BY MOUTH NIGHTLY]    Advair -21 mcg/actuation Inhalation HFA Aerosol Inhaler [inhale 2 puffs by inhalation route 2 times per day in the morning andevening]    Vitamin D3 25 mcg (1,000 unit) oral capsule [take 1 capsule daily]    metoprolol succinate 50 mg oral Tablet, Extended Release 24 hr [take 1 tablet (50 mg) by oral route once daily]    warfarin 5 mg oral tablet [take 7.5mg MWF, 5mg rest]        Objective:        Vitals:          Current: 5/25/2021 2:20:03 PM    Ht:  5 ft, 7 in;  Wt: 250 lbs;  BMI: 39.2T: 97.4 F (temporal);  BP: 95/54 mm Hg (right arm, sitting);  P: 93 bpm (left arm (BP Cuff), sitting);  sCr: 0.76 mg/dL;  GFR: 104.84        Repeat:     2:21:34 PM  BP:   143/83mm Hg (left arm, sitting, p89)     Exams:     PHYSICAL EXAM:     GENERAL: Vitals recorded well developed, well nourished;  disheveled appearance, unkempt;  no apparent distress;     EYES: extraocular movements intact; conjunctiva and cornea are normal; PERRLA;     E/N/T: OROPHARYNX:  normal mucosa, dentition, gingiva, and posterior pharynx;     NECK: range of motion is normal;     RESPIRATORY: normal respiratory rate and pattern with no distress; normal breath sounds with no rales, rhonchi, wheezes or rubs;     CARDIOVASCULAR: normal rate; rhythm is regular;  no systolic murmur; 1+ pedal edema;     GASTROINTESTINAL: nontender; normal bowel sounds;     NEUROLOGIC: mental status: alert and oriented x 3;     PSYCHIATRIC:  appropriate affect and demeanor; normal speech pattern; grossly normal memory;         Lab/Test Results:         Coumadin (text dose): 5 mg daily per pt/tls (05/25/2021),     INR: 2.0 (05/25/2021),     Diagnosis / Indication: hx DVT (05/25/2021),     Patient Education Offered: Yes (05/25/2021),             Assessment:         Z00.00   Encounter for general adult medical examination without abnormal findings       Z13.31   Encounter for screening for depression       E11.9   Type 2 diabetes mellitus without complications       Z86.711   Personal history of pulmonary embolism       I10   Essential (primary) hypertension       E78.5   Hyperlipidemia, unspecified       Z79.01   Long term (current) use of anticoagulants           ORDERS:         Lab Orders:       [id number]  A1C - [facility] Hemoglobin A1C  (Send-Out)            [id number]  PT/INR  (In-House)              Procedures Ordered:         Annual wellness visit, includes a PPPS, subsequent visit   (In-House)              Other Orders:         Depression screen negative  (In-House)                      Plan:         Encounter for general adult medical examination without abnormal findings- Appropriate screenings and vaccinations were reviewed and offered as indicated.          Orders:         Annual wellness visit, includes a PPPS, subsequent visit  (In-House)              Encounter for screening for depression    MIPS PHQ-9 Depression Screening: Completed form scanned and in chart; Total Score 0; Negative Depression Screen           Orders:         Depression screen negative  (In-House)              Type 2 diabetes mellitus without complications- Stable.  Continue Farxiga 5 mg daily and Janumet  mg twice daily.  A1c ordered today.              Orders:       [id number]  A1C - [facility] Hemoglobin A1C  (Send-Out)              Personal history of pulmonary embolism- Stable.  Continue warfarin as directed.        Essential (primary) hypertension- Stable. Continue Toprol-XL 50 mg daily.         Hyperlipidemia, unspecifiedStable. Continue Crestor 20 mg daily.          Long term (current) use of anticoagulants          Orders:       [id number]  PT/INR  (In-House)                  Charge Capture:         Primary Diagnosis:     Z00.00  Encounter for general adult medical examination without abnormal findings           Orders:        Annual wellness visit, includes a PPPS, subsequent visit  (In-House)              Z13.31  Encounter for screening for depression           Orders:        Depression screen negative  (In-House)              E11.9  Type 2 diabetes mellitus without complications     Z86.711  Personal history of pulmonary embolism     I10  Essential (primary) hypertension     E78.5  Hyperlipidemia, unspecified     Z79.01  Long term (current) use of anticoagulants           Orders:      [id number]  PT/INR  (In-House)                  ADDENDUMS:      ____________________________________    Addendum:  05/28/2021 07:56 AM - Jennie Noble        cc is a follow up/bb

## 2021-06-11 ENCOUNTER — ANTICOAGULATION VISIT (OUTPATIENT)
Dept: FAMILY MEDICINE CLINIC | Age: 66
End: 2021-06-11

## 2021-06-11 DIAGNOSIS — Z79.01 LONG TERM (CURRENT) USE OF ANTICOAGULANTS: Primary | ICD-10-CM

## 2021-06-11 LAB — INR PPP: 2.7 (ref 2–3)

## 2021-06-11 PROCEDURE — 85610 PROTHROMBIN TIME: CPT | Performed by: FAMILY MEDICINE

## 2021-06-11 PROCEDURE — 36416 COLLJ CAPILLARY BLOOD SPEC: CPT | Performed by: FAMILY MEDICINE

## 2021-07-02 VITALS
HEIGHT: 67 IN | BODY MASS INDEX: 38.31 KG/M2 | HEART RATE: 89 BPM | DIASTOLIC BLOOD PRESSURE: 93 MMHG | SYSTOLIC BLOOD PRESSURE: 135 MMHG

## 2021-07-02 VITALS
SYSTOLIC BLOOD PRESSURE: 130 MMHG | WEIGHT: 244 LBS | DIASTOLIC BLOOD PRESSURE: 79 MMHG | HEART RATE: 89 BPM | HEIGHT: 67 IN | BODY MASS INDEX: 38.3 KG/M2 | TEMPERATURE: 99.2 F

## 2021-07-02 VITALS — BODY MASS INDEX: 38.22 KG/M2 | TEMPERATURE: 97.3 F | HEIGHT: 67 IN

## 2021-07-02 VITALS — BODY MASS INDEX: 38.22 KG/M2 | HEIGHT: 67 IN | TEMPERATURE: 98.3 F

## 2021-07-02 VITALS — BODY MASS INDEX: 40.28 KG/M2 | TEMPERATURE: 97.2 F | HEIGHT: 67 IN

## 2021-07-02 VITALS
HEART RATE: 83 BPM | BODY MASS INDEX: 40.37 KG/M2 | SYSTOLIC BLOOD PRESSURE: 158 MMHG | DIASTOLIC BLOOD PRESSURE: 82 MMHG | HEIGHT: 67 IN | WEIGHT: 257.2 LBS | TEMPERATURE: 96.2 F

## 2021-07-02 VITALS
WEIGHT: 243.4 LBS | BODY MASS INDEX: 38.2 KG/M2 | HEIGHT: 67 IN | SYSTOLIC BLOOD PRESSURE: 88 MMHG | TEMPERATURE: 97.8 F | DIASTOLIC BLOOD PRESSURE: 62 MMHG | HEART RATE: 102 BPM

## 2021-07-02 VITALS
HEIGHT: 67 IN | DIASTOLIC BLOOD PRESSURE: 51 MMHG | SYSTOLIC BLOOD PRESSURE: 86 MMHG | TEMPERATURE: 98.4 F | BODY MASS INDEX: 38.3 KG/M2 | WEIGHT: 244 LBS | HEART RATE: 92 BPM

## 2021-07-02 VITALS
TEMPERATURE: 97.4 F | BODY MASS INDEX: 39.24 KG/M2 | DIASTOLIC BLOOD PRESSURE: 83 MMHG | HEART RATE: 93 BPM | WEIGHT: 250 LBS | SYSTOLIC BLOOD PRESSURE: 143 MMHG | HEIGHT: 67 IN

## 2021-07-02 VITALS
DIASTOLIC BLOOD PRESSURE: 89 MMHG | WEIGHT: 232.8 LBS | SYSTOLIC BLOOD PRESSURE: 127 MMHG | BODY MASS INDEX: 36.54 KG/M2 | HEIGHT: 67 IN | HEART RATE: 93 BPM | TEMPERATURE: 97.8 F

## 2021-07-02 VITALS
WEIGHT: 244.6 LBS | HEIGHT: 67 IN | BODY MASS INDEX: 38.39 KG/M2 | HEART RATE: 107 BPM | TEMPERATURE: 97.5 F | DIASTOLIC BLOOD PRESSURE: 85 MMHG | SYSTOLIC BLOOD PRESSURE: 128 MMHG

## 2021-07-02 VITALS — BODY MASS INDEX: 36.46 KG/M2 | TEMPERATURE: 97.3 F | HEIGHT: 67 IN

## 2021-07-02 VITALS
SYSTOLIC BLOOD PRESSURE: 121 MMHG | DIASTOLIC BLOOD PRESSURE: 86 MMHG | HEART RATE: 92 BPM | BODY MASS INDEX: 38.12 KG/M2 | HEIGHT: 67 IN

## 2021-07-02 VITALS — BODY MASS INDEX: 38.22 KG/M2 | HEIGHT: 67 IN | TEMPERATURE: 98.6 F

## 2021-07-02 VITALS — HEIGHT: 67 IN | BODY MASS INDEX: 36.46 KG/M2 | TEMPERATURE: 98.2 F

## 2021-07-02 VITALS — TEMPERATURE: 97.1 F | BODY MASS INDEX: 40.28 KG/M2 | HEIGHT: 67 IN
